# Patient Record
Sex: FEMALE | Race: BLACK OR AFRICAN AMERICAN | NOT HISPANIC OR LATINO | RURAL
[De-identification: names, ages, dates, MRNs, and addresses within clinical notes are randomized per-mention and may not be internally consistent; named-entity substitution may affect disease eponyms.]

---

## 2022-10-28 DIAGNOSIS — S82.61XD CLOSED DISPLACED FRACTURE OF LATERAL MALLEOLUS OF RIGHT FIBULA WITH ROUTINE HEALING: Primary | ICD-10-CM

## 2022-10-28 DIAGNOSIS — M25.571 RIGHT ANKLE PAIN: ICD-10-CM

## 2023-06-28 DIAGNOSIS — M76.821 POSTERIOR TIBIAL TENDON DYSFUNCTION, RIGHT: Primary | ICD-10-CM

## 2023-07-06 ENCOUNTER — CLINICAL SUPPORT (OUTPATIENT)
Dept: REHABILITATION | Facility: HOSPITAL | Age: 48
End: 2023-07-06
Payer: COMMERCIAL

## 2023-07-06 DIAGNOSIS — M25.671 DECREASED RANGE OF MOTION OF RIGHT ANKLE: ICD-10-CM

## 2023-07-06 DIAGNOSIS — M76.821 POSTERIOR TIBIAL TENDON DYSFUNCTION (PTTD) OF RIGHT LOWER EXTREMITY: Primary | ICD-10-CM

## 2023-07-06 PROCEDURE — 97161 PT EVAL LOW COMPLEX 20 MIN: CPT

## 2023-07-06 NOTE — PLAN OF CARE
"[facility]  Physical Therapy Initial Evaluation    Name: Sherin Zayas  Clinic Number: [id number]    Therapy Diagnosis:   Encounter Diagnoses   Name Primary?    Posterior tibial tendon dysfunction (PTTD) of right lower extremity Yes    Decreased range of motion of right ankle      Physician: Dimitri Ho, *    Physician Orders: PT Eval and Treat  Medical Diagnosis from Referral: M76.821 (ICD-10-CM) - Posterior tibial tendon dysfunction, right  Evaluation Date: 7/6/2023  Authorization Period Expiration: 6/27/2024  Plan of Care Expiration: 8/18/2023  Visit # / Visits authorized: 1/13 (including initial evaluation; 90 visits/calendar year)    Precautions: Standard and Fall    Time In: 1029  Time Out: 1110  Total Appointment Time (timed & untimed codes): 41 minutes    Subjective     Date of onset: 8/19/2022    History of current condition - SHERIN reports she broke a bone in both of her feet due to a fall off a curb on 8/19/2022. 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 Patient reports began to have an increase in pain over the last few weeks. Patient is now off work for 6 weeks and is in a walking boot for the right lower extremity (to be work x 6 weeks). Patient follow-up with Dr. Ho on 8/4/2023.      Falls: 1 fall ~1 month ago due to knees "giving out" (no significant injuries; no medical care sought)    Imaging: no related imaging available to view in Epic    Prior Therapy: home health physical therapy following initial fall in the Fall of 2022  Social History: lives alone  Steps/Stairs: none  Occupation: dialysis technician; Patient reports she does not feel as though she is going to be able to return to work.  Driving: Yes  Durable Medical Equipment: right walking boot; single point cane; rolling walker  Dominant Extremity: right  Affected Extremity: right  Prior Level of Function: independent with all " functional mobility without assistive device  Current Level of Function: modified independent with the use of a single point cane and right walking boot    Pain:  Current 5/10, worst 9/10, best 5/10   Location: below right medial malleolus  Description: Aching  Aggravating Factors: prolonged standing and walking  Easing Factors: pain medication, ice, rest, and elevation    Pts goals: Patient's goal is to decrease her right ankle pain.     Medical History:   No past medical history on file.    Surgical History:   Sherin Zayas  has no past surgical history on file.    Medications:   Sherin currently has no medications in their medication list.    Allergies:   Review of patient's allergies indicates:  Not on File     Objective     Range of Motion/Strength:     Knee Right Left Pain/Dysfunction with Movement   AROM/PROM      flexion  WFL  WFL    extension  WFL  WFL      Ankle Right Left Pain/Dysfunction with Movement   AROM/PROM      dorsiflexion  2*  6*    plantarflexion  45*  40*    inversion 20* 28*    eversion 24* 20*      L/E MMT Right Left Pain/Dysfunction with Movement   Knee Flexion 4/5 4/5    Knee Extension 4/5 4/5    Ankle DF 4+/5 4/5    Ankle PF 4/5 4/5    Ankle Inversion 4/5 4/5    Ankle Eversion 4/5 4/5      Posture: bilateral over pronation in standing (left worse)    Palpation: tenderness on palpation just distal to the medial malleolus    Sensation: WFL for light touch, pain, and temperature; no reports of numbness/tingling    Flexibility: mild stiffness in bilateral ankles    Gait Analysis: independent without assistive device; right walking boot donned; decreased step lengths bilaterally; side base of support    Transfers: modified independent with use of upper extremities    Other: up steps with a step-to pattern leading with the left lower extremity with bilateral upper extremity support; down steps with a step-to pattern leading with the right lower extremity with bilateral upper extremity  "support    Intake Outcome Measure for FOTO Survey    Therapist reviewed FOTO scores for Sherin Zayas on 7/6/2023.   FOTO documents entered into EPIC - see Media section.    Intake Score: 25%       Patient Education and Home Exercises     Education provided: Patient educated on the importance of completing skilled physical therapy treatment and home exercise program as prescribed to maximize functional gains.     Written Home Exercises Provided: yes. Exercises were reviewed and SHERIN was able to demonstrate them prior to the end of the session. SHERIN demonstrated good  understanding of the education provided.     See EMR under "Patient Instructions" for exercises provided during therapy sessions.    Assessment     Sherin is a 48 y.o. female referred to outpatient physical therapy with a medical diagnosis of M76.821 (ICD-10-CM) - Posterior tibial tendon dysfunction, right. Pt presents to PT with decreased right ankle range of motion, bilateral lower extremity weakness, increased assist required with transfers, and abnormal gait/stair negotiation mechanics.    Pt prognosis is Good.   Pt will benefit from skilled outpatient Physical Therapy to address the deficits stated above and in the chart below, provide pt/family education, and to maximize pt's level of independence.     Plan of care discussed with patient: Yes  Pt's spiritual, cultural and educational needs considered and pt agreeable to plan of care and goals as stated below:     Anticipated Barriers for therapy: compliance with home exercise program; natural course of healing    Medical Necessity is demonstrated by the following:  History  Co-morbidities and personal factors that may impact the plan of care [] LOW: no personal factors / co-morbidities  [x] MODERATE: 1-2 personal factors / co-morbidities  [] HIGH: 3+ personal factors / co-morbidities    Moderate / High Support Documentation:   Co-morbidities affecting plan of care: N/A    Personal Factors: "   attitudes     Examination  Body Structures and Functions, activity limitations and participation restrictions that may impact the plan of care [x] LOW: addressing 1-2 elements  [] MODERATE: 3+ elements  [] HIGH: 4+ elements (please support below)    Moderate / High Support Documentation: N/A     Clinical Presentation [x] LOW: stable  [] MODERATE: Evolving  [] HIGH: Unstable     Decision Making/ Complexity Score: low       Goals:    Short Term Goals:  Patient will demonstrate independence with home exercise program to ensure carryover of treatment.  Patient will perform sit to/from stand with standby assist without upper extremity support to improve independence and safety with transfers.  Patient will improve right ankle dorsiflexion active range of motion to 6 degrees to improve functional use of the right ankle.   Patient will improve bilateral lower extremity strength to 4+/5 to improve independence and safety with bed mobility, transfers, and gait.  Patient will ambulate 150 feet without assistive device with supervision with normal gait mechanics to improve independence and safety with gait.     Long Term Goals:  Patient will perform sit to/from stand with independence without upper extremity support to improve independence and safety with transfers.  Patient will improve bilateral lower extremity strength to 5/5 to improve independence and safety with bed mobility, transfers, and gait..   Patient will ambulate 300 feet without assistive device with independence with normal gait mechanics including up/down curbs and ramps to improve independence and safety with community ambulation.    Plan     Plan of care Certification: 7/6/2023 to 8/18/2023.    Outpatient Physical Therapy 2 times weekly for 6 weeks to include the following interventions: Electrical Stimulation (premodulated IFC), Gait Training, Manual Therapy, Moist Heat/ Ice, Neuromuscular Re-ed, Patient Education, Therapeutic Activities, and Therapeutic  Madeleine.       Amos Barrientos, PT, DPT  7/6/2023      Physician's Signature: _________________________________________  Date: __________________________

## 2023-07-10 ENCOUNTER — CLINICAL SUPPORT (OUTPATIENT)
Dept: REHABILITATION | Facility: HOSPITAL | Age: 48
End: 2023-07-10
Payer: COMMERCIAL

## 2023-07-10 DIAGNOSIS — M76.821 POSTERIOR TIBIAL TENDON DYSFUNCTION (PTTD) OF RIGHT LOWER EXTREMITY: Primary | ICD-10-CM

## 2023-07-10 DIAGNOSIS — M25.671 DECREASED RANGE OF MOTION OF RIGHT ANKLE: ICD-10-CM

## 2023-07-10 PROCEDURE — 97140 MANUAL THERAPY 1/> REGIONS: CPT | Mod: CQ

## 2023-07-10 PROCEDURE — 97112 NEUROMUSCULAR REEDUCATION: CPT | Mod: CQ

## 2023-07-10 PROCEDURE — 97110 THERAPEUTIC EXERCISES: CPT | Mod: CQ

## 2023-07-10 NOTE — PROGRESS NOTES
"[facility]  Physical Therapy Treatment Note    Name: Sherin Zayas  Clinic Number: [id number]    Therapy Diagnosis: No diagnosis found.  Physician: Order, Paper    Visit Date: 7/10/2023    Physician Orders: PT Eval and Treat  Medical Diagnosis from Referral: M76.821 (ICD-10-CM) - Posterior tibial tendon dysfunction, right  Evaluation Date: 7/6/2023  Authorization Period Expiration: 6/27/2024  Plan of Care Expiration: 8/18/2023  Visit # / Visits authorized: 2/13 (including initial evaluation; 90 visits/calendar year)  PTA Visit #: 1    Time In: 1101  Time Out: 1140  Total Billable Time: 39 minutes    Precautions: Standard and Fall    Subjective     Pt reports: her right ankle is hurting today; patient did not have boot on upon arrival to therapy.    She was compliant with home exercise program. "They hurt when I do them. Around my toes too."    Pain: 6/10  Location: below right medial malleolus    Objective     SHERIN received therapeutic exercises to develop strength, endurance, ROM, and flexibility for 20 minutes including:    NuStep: x 6 minutes  Calf stretch on slant board: x 2 minutes  Towel scrunches: x 20 reps  Theraband right ankle strengthening: plantarflexion, dorsiflexion, inversion, and eversion; red theraband; x 20 reps each      SHERIN received the following manual therapy techniques: were applied to the: right ankle for 8 minutes, including:    Static stretching into plantar flexion, dorsiflexion, inversion, and eversion    SHERIN participated in neuromuscular re-education activities to improve: Balance and Coordination for 11 minutes. The following activities were included:    [place] pickup: x 3 minutes  Ankle ABC's: x 2 sets  Heel and toe raises: x 20 reps; least Upper extremity support required    SHERIN participated in gait training to improve functional mobility and safety for 0 minutes, including:  Not performed      Home Exercises Provided and Patient Education " Provided     Education provided: Patient educated on the importance of completing skilled physical therapy treatment and home exercise program as prescribed to maximize functional gains.    Written Home Exercises Provided: Patient instructed to cont prior HEP. Exercises were reviewed and PIPER was able to demonstrate them prior to the end of the session.  PIPER demonstrated good  understanding of the education provided.     See EMR under Patient Instructions for exercises provided  during therapy sessions .    Assessment     Patient with good overall effort. Patient able to perform all exercises with no increase in pain reported. Patient states her ankle felt tired and sore post treatment, but no increase in pain reported.    PIPER is progressing well towards her goals.   Pt prognosis is Good.     Pt will continue to benefit from skilled outpatient physical therapy to address the deficits listed in the problem list box on initial evaluation, provide pt/family education, and to maximize pt's level of independence in the home and community environment.     Pt's spiritual, cultural, and educational needs considered and pt agreeable to plan of care and goals.     Anticipated barriers to physical therapy: compliance with home exercise program; natural course of healing    Goals:    Short Term Goals:  Patient will demonstrate independence with home exercise program to ensure carryover of treatment.  Patient will perform sit to/from stand with standby assist without upper extremity support to improve independence and safety with transfers.  Patient will improve right ankle dorsiflexion active range of motion to 6 degrees to improve functional use of the right ankle.   Patient will improve bilateral lower extremity strength to 4+/5 to improve independence and safety with bed mobility, transfers, and gait.  Patient will ambulate 150 feet without assistive device with supervision with normal gait mechanics to improve  independence and safety with gait.      Long Term Goals:  Patient will perform sit to/from stand with independence without upper extremity support to improve independence and safety with transfers.  Patient will improve bilateral lower extremity strength to 5/5 to improve independence and safety with bed mobility, transfers, and gait..   Patient will ambulate 300 feet without assistive device with independence with normal gait mechanics including up/down curbs and ramps to improve independence and safety with community ambulation.    Plan     Patient will continue to benefit from skilled physical therapy treatment as prescribed working towards goals listed above to maximize functional potential.       Charlie Mendoza, PTA  7/10/2023

## 2023-07-14 ENCOUNTER — CLINICAL SUPPORT (OUTPATIENT)
Dept: REHABILITATION | Facility: HOSPITAL | Age: 48
End: 2023-07-14
Payer: COMMERCIAL

## 2023-07-14 DIAGNOSIS — M25.671 DECREASED RANGE OF MOTION OF RIGHT ANKLE: ICD-10-CM

## 2023-07-14 DIAGNOSIS — M76.821 POSTERIOR TIBIAL TENDON DYSFUNCTION (PTTD) OF RIGHT LOWER EXTREMITY: Primary | ICD-10-CM

## 2023-07-14 PROCEDURE — 97110 THERAPEUTIC EXERCISES: CPT | Mod: CQ

## 2023-07-14 PROCEDURE — 97112 NEUROMUSCULAR REEDUCATION: CPT | Mod: CQ

## 2023-07-14 PROCEDURE — 97140 MANUAL THERAPY 1/> REGIONS: CPT | Mod: CQ

## 2023-07-14 NOTE — PROGRESS NOTES
"[facility]  Physical Therapy Treatment Note    Name: Sherin Zayas  Clinic Number: [id number]    Therapy Diagnosis:   Encounter Diagnoses   Name Primary?    Posterior tibial tendon dysfunction (PTTD) of right lower extremity Yes    Decreased range of motion of right ankle      Physician: Order, Paper    Visit Date: 7/14/2023    Physician Orders: PT Eval and Treat  Medical Diagnosis from Referral: M76.821 (ICD-10-CM) - Posterior tibial tendon dysfunction, right  Evaluation Date: 7/6/2023  Authorization Period Expiration: 6/27/2024  Plan of Care Expiration: 8/18/2023  Visit # / Visits authorized: 3/13 (including initial evaluation; 90 visits/calendar year)  PTA Visit #: 2    Time In: 1012  Time Out: 1057  Total Billable Time: 45 minutes    Precautions: Standard and Fall    Subjective     Pt reports: both her ankles are hurting today; "it is okay until I try to stand up"; patient did not have boot on upon arrival to therapy stating "I didn't wear my boot in because I knew I would probably have to just take them off."    She was compliant with home exercise program. "They hurt when I do them. Around my toes too."    Pain: 5/10 ; 5/10  Location: below right medial malleolus; left ankle    Objective     SHERIN received therapeutic exercises to develop strength, endurance, ROM, and flexibility for 22 minutes including:    NuStep: x 6 minutes  Calf stretch on slant board: x 2 minutes  Towel scrunches: x 20 reps  Theraband right ankle strengthening: plantarflexion, dorsiflexion, inversion, and eversion; red theraband; x 20 reps each    SHERIN received the following manual therapy techniques: were applied to the: right ankle for 10 minutes, including:    Static stretching into plantar flexion, dorsiflexion, inversion, and eversion    SHERIN participated in neuromuscular re-education activities to improve: Balance and Coordination for 13 minutes. The following activities were " included:    [place] pickup: x 3 minutes  Ankle ABC's: x 2 sets  Heel and toe raises: x 20 reps; least Upper extremity support required    PIPER participated in gait training to improve functional mobility and safety for 0 minutes, including:  Not performed      Home Exercises Provided and Patient Education Provided     Education provided: Patient educated on the importance of completing skilled physical therapy treatment and home exercise program as prescribed to maximize functional gains.    Written Home Exercises Provided: Patient instructed to cont prior HEP. Exercises were reviewed and PIPER was able to demonstrate them prior to the end of the session.  PIPER demonstrated good  understanding of the education provided.     See EMR under Patient Instructions for exercises provided  during therapy sessions .    Assessment     Patient with good overall effort. Patient able to perform all exercises with no increase in pain reported. Patient with good tolerance of manual stretching of right ankle in all directions. Patient states her ankle felt tired post treatment.    IPPER is progressing well towards her goals.   Pt prognosis is Good.     Pt will continue to benefit from skilled outpatient physical therapy to address the deficits listed in the problem list box on initial evaluation, provide pt/family education, and to maximize pt's level of independence in the home and community environment.     Pt's spiritual, cultural, and educational needs considered and pt agreeable to plan of care and goals.     Anticipated barriers to physical therapy: compliance with home exercise program; natural course of healing    Goals:    Short Term Goals:  Patient will demonstrate independence with home exercise program to ensure carryover of treatment.  Patient will perform sit to/from stand with standby assist without upper extremity support to improve independence and safety with transfers.  Patient will improve right ankle  dorsiflexion active range of motion to 6 degrees to improve functional use of the right ankle.   Patient will improve bilateral lower extremity strength to 4+/5 to improve independence and safety with bed mobility, transfers, and gait.  Patient will ambulate 150 feet without assistive device with supervision with normal gait mechanics to improve independence and safety with gait.      Long Term Goals:  Patient will perform sit to/from stand with independence without upper extremity support to improve independence and safety with transfers.  Patient will improve bilateral lower extremity strength to 5/5 to improve independence and safety with bed mobility, transfers, and gait..   Patient will ambulate 300 feet without assistive device with independence with normal gait mechanics including up/down curbs and ramps to improve independence and safety with community ambulation.    Plan     Patient will continue to benefit from skilled physical therapy treatment as prescribed working towards goals listed above to maximize functional potential.       Charlie Mendoza, PTA  7/14/2023

## 2023-07-21 ENCOUNTER — CLINICAL SUPPORT (OUTPATIENT)
Dept: REHABILITATION | Facility: HOSPITAL | Age: 48
End: 2023-07-21
Payer: COMMERCIAL

## 2023-07-21 DIAGNOSIS — M25.671 DECREASED RANGE OF MOTION OF RIGHT ANKLE: ICD-10-CM

## 2023-07-21 DIAGNOSIS — M76.821 POSTERIOR TIBIAL TENDON DYSFUNCTION (PTTD) OF RIGHT LOWER EXTREMITY: Primary | ICD-10-CM

## 2023-07-21 PROCEDURE — 97112 NEUROMUSCULAR REEDUCATION: CPT | Mod: CQ

## 2023-07-21 PROCEDURE — 97140 MANUAL THERAPY 1/> REGIONS: CPT | Mod: CQ

## 2023-07-21 PROCEDURE — 97110 THERAPEUTIC EXERCISES: CPT | Mod: CQ

## 2023-07-21 NOTE — PROGRESS NOTES
"[facility]  Physical Therapy Treatment Note    Name: Sherin Zayas  Clinic Number: [id number]    Therapy Diagnosis:   Encounter Diagnoses   Name Primary?    Posterior tibial tendon dysfunction (PTTD) of right lower extremity Yes    Decreased range of motion of right ankle      Physician: Order, Paper    Visit Date: 7/21/2023    Physician Orders: PT Eval and Treat  Medical Diagnosis from Referral: M76.821 (ICD-10-CM) - Posterior tibial tendon dysfunction, right  Evaluation Date: 7/6/2023  Authorization Period Expiration: 6/27/2024  Plan of Care Expiration: 8/18/2023  Visit # / Visits authorized: 4/13 (including initial evaluation; 90 visits/calendar year)  PTA Visit #: 3    Time In: 1115 (Patient 9 minutes late for appointment time)  Time Out: 1155  Total Billable Time: 40 minutes    Precautions: Standard and Fall    Subjective     Pt reports: both ankles are hurting her this morning stating "I had to take a pain pill for it this morning."    She was compliant with home exercise program.    Pain: 8/10 ; 8/10  Location: below right medial malleolus; left ankle    Objective     SHERIN received therapeutic exercises to develop strength, endurance, ROM, and flexibility for 20 minutes including:    NuStep: x 5 minutes  Calf stretch on slant board: x 2 minutes  Towel scrunches: x 20 reps  Theraband right ankle strengthening (bilateral): plantarflexion, dorsiflexion, inversion, and eversion; red theraband; x 20 reps each    SHERIN received the following manual therapy techniques: were applied to the: right ankle for 10 minutes, including:    Static stretching into plantar flexion, dorsiflexion, inversion, and eversion    SHERIN participated in neuromuscular re-education activities to improve: Balance and Coordination for 10 minutes. The following activities were included:    [place] pickup: x 3 minutes  Ankle ABC's: x 2 sets  Heel and toe raises: x 20 reps; least Upper extremity support " required    PIPER participated in gait training to improve functional mobility and safety for 0 minutes, including:  Not performed      Home Exercises Provided and Patient Education Provided     Education provided: Patient educated on the importance of completing skilled physical therapy treatment and home exercise program as prescribed to maximize functional gains.    Written Home Exercises Provided: Patient instructed to cont prior HEP. Exercises were reviewed and PIPER was able to demonstrate them prior to the end of the session.  PIPER demonstrated good  understanding of the education provided.     See EMR under Patient Instructions for exercises provided  during therapy sessions .    Assessment     Patient with good overall effort. Patient able to perform all exercises with no increase in pain reported. Patient with no new complaints following treatment.    PIPER is progressing well towards her goals.   Pt prognosis is Good.     Pt will continue to benefit from skilled outpatient physical therapy to address the deficits listed in the problem list box on initial evaluation, provide pt/family education, and to maximize pt's level of independence in the home and community environment.     Pt's spiritual, cultural, and educational needs considered and pt agreeable to plan of care and goals.     Anticipated barriers to physical therapy: compliance with home exercise program; natural course of healing    Goals:    Short Term Goals:  Patient will demonstrate independence with home exercise program to ensure carryover of treatment.  Patient will perform sit to/from stand with standby assist without upper extremity support to improve independence and safety with transfers.  Patient will improve right ankle dorsiflexion active range of motion to 6 degrees to improve functional use of the right ankle.   Patient will improve bilateral lower extremity strength to 4+/5 to improve independence and safety with bed mobility,  transfers, and gait.  Patient will ambulate 150 feet without assistive device with supervision with normal gait mechanics to improve independence and safety with gait.      Long Term Goals:  Patient will perform sit to/from stand with independence without upper extremity support to improve independence and safety with transfers.  Patient will improve bilateral lower extremity strength to 5/5 to improve independence and safety with bed mobility, transfers, and gait..   Patient will ambulate 300 feet without assistive device with independence with normal gait mechanics including up/down curbs and ramps to improve independence and safety with community ambulation.    Plan     Patient will continue to benefit from skilled physical therapy treatment as prescribed working towards goals listed above to maximize functional potential.       Charlie Mendoza, PTA  7/21/2023

## 2023-07-27 ENCOUNTER — CLINICAL SUPPORT (OUTPATIENT)
Dept: REHABILITATION | Facility: HOSPITAL | Age: 48
End: 2023-07-27
Payer: COMMERCIAL

## 2023-07-27 DIAGNOSIS — M76.821 POSTERIOR TIBIAL TENDON DYSFUNCTION (PTTD) OF RIGHT LOWER EXTREMITY: Primary | ICD-10-CM

## 2023-07-27 DIAGNOSIS — M25.671 DECREASED RANGE OF MOTION OF RIGHT ANKLE: ICD-10-CM

## 2023-07-27 PROCEDURE — 97110 THERAPEUTIC EXERCISES: CPT | Mod: CQ

## 2023-07-27 PROCEDURE — 97112 NEUROMUSCULAR REEDUCATION: CPT | Mod: CQ

## 2023-07-27 NOTE — PROGRESS NOTES
"[facility]  Physical Therapy Treatment Note    Name: Sherin aZyas  Clinic Number: [id number]    Therapy Diagnosis:   Encounter Diagnoses   Name Primary?    Posterior tibial tendon dysfunction (PTTD) of right lower extremity Yes    Decreased range of motion of right ankle      Physician: Dimitri Ho, *    Visit Date: 7/27/2023    Physician Orders: PT Eval and Treat  Medical Diagnosis from Referral: M76.821 (ICD-10-CM) - Posterior tibial tendon dysfunction, right  Evaluation Date: 7/6/2023  Authorization Period Expiration: 6/27/2024  Plan of Care Expiration: 8/18/2023  Visit # / Visits authorized: 5/13 (including initial evaluation; 90 visits/calendar year)  PTA Visit #: 4    Time In: 1019  Time Out: 1057  Total Billable Time: 38 minutes    Precautions: Standard and Fall    Subjective     Pt reports: pain in both ankles this morning that she rates as 7/10    She was compliant with home exercise program.    Pain: 7/10 ; 7/10  Location: below right medial malleolus; left ankle    Objective     SHERIN received therapeutic exercises to develop strength, endurance, ROM, and flexibility for 23 minutes including:    NuStep: x 5 minutes  Calf stretch on slant board: x 2 minutes  Towel scrunches: x 30 reps  Theraband right ankle strengthening (bilateral): plantarflexion, dorsiflexion, inversion, and eversion; green theraband; x 20 reps each  2" step up: anterior; 2 x 10 reps    SHERIN received the following manual therapy techniques: were applied to the: right ankle for 0 minutes, including:    Static stretching into plantar flexion, dorsiflexion, inversion, and eversion (not performed)    SHERIN participated in neuromuscular re-education activities to improve: Balance and Coordination for 15 minutes. The following activities were included:    [place] pickup (right): x 3 minutes  Ankle ABC's (bilateral): x 2 sets each  Heel and toe raises: x 20 reps; least Upper extremity support " required  Single leg stance: 3 x 15 sec hold each    PIPER participated in gait training to improve functional mobility and safety for 0 minutes, including:  Not performed      Home Exercises Provided and Patient Education Provided     Education provided: Patient educated on the importance of completing skilled physical therapy treatment and home exercise program as prescribed to maximize functional gains.    Written Home Exercises Provided: Patient instructed to cont prior HEP. Exercises were reviewed and PIPER was able to demonstrate them prior to the end of the session.  PIPER demonstrated good  understanding of the education provided.     See EMR under Patient Instructions for exercises provided  during therapy sessions .    Assessment     Patient with good overall effort. Patient able to perform all exercises with no increase in pain reported. Patient with good tolerance of added single leg stance, anterior step ups, and added resistance to ankle 4 way exercises. Patient with no new complaints following treatment.    PIPER is progressing well towards her goals.   Pt prognosis is Good.     Pt will continue to benefit from skilled outpatient physical therapy to address the deficits listed in the problem list box on initial evaluation, provide pt/family education, and to maximize pt's level of independence in the home and community environment.     Pt's spiritual, cultural, and educational needs considered and pt agreeable to plan of care and goals.     Anticipated barriers to physical therapy: compliance with home exercise program; natural course of healing    Goals:    Short Term Goals:  Patient will demonstrate independence with home exercise program to ensure carryover of treatment.  Patient will perform sit to/from stand with standby assist without upper extremity support to improve independence and safety with transfers.  Patient will improve right ankle dorsiflexion active range of motion to 6 degrees to  improve functional use of the right ankle.   Patient will improve bilateral lower extremity strength to 4+/5 to improve independence and safety with bed mobility, transfers, and gait.  Patient will ambulate 150 feet without assistive device with supervision with normal gait mechanics to improve independence and safety with gait.      Long Term Goals:  Patient will perform sit to/from stand with independence without upper extremity support to improve independence and safety with transfers.  Patient will improve bilateral lower extremity strength to 5/5 to improve independence and safety with bed mobility, transfers, and gait..   Patient will ambulate 300 feet without assistive device with independence with normal gait mechanics including up/down curbs and ramps to improve independence and safety with community ambulation.    Plan     Patient will continue to benefit from skilled physical therapy treatment as prescribed working towards goals listed above to maximize functional potential.       Charlie Mendoza, JORDI  7/27/2023

## 2023-08-01 ENCOUNTER — CLINICAL SUPPORT (OUTPATIENT)
Dept: REHABILITATION | Facility: HOSPITAL | Age: 48
End: 2023-08-01
Payer: COMMERCIAL

## 2023-08-01 DIAGNOSIS — M76.821 POSTERIOR TIBIAL TENDON DYSFUNCTION (PTTD) OF RIGHT LOWER EXTREMITY: Primary | ICD-10-CM

## 2023-08-01 DIAGNOSIS — M25.671 DECREASED RANGE OF MOTION OF RIGHT ANKLE: ICD-10-CM

## 2023-08-01 PROCEDURE — 97110 THERAPEUTIC EXERCISES: CPT | Mod: CQ

## 2023-08-01 PROCEDURE — 97112 NEUROMUSCULAR REEDUCATION: CPT | Mod: CQ

## 2023-08-01 NOTE — PROGRESS NOTES
"[facility]  Physical Therapy Treatment Note    Name: Sherin Zayas  Clinic Number: [id number]    Therapy Diagnosis:   No diagnosis found.    Physician: Order, Paper    Visit Date: 8/1/2023    Physician Orders: PT Eval and Treat  Medical Diagnosis from Referral: M76.821 (ICD-10-CM) - Posterior tibial tendon dysfunction, right  Evaluation Date: 7/6/2023  Authorization Period Expiration: 6/27/2024  Plan of Care Expiration: 8/18/2023  Visit # / Visits authorized: 6/13 (including initial evaluation; 90 visits/calendar year)  PTA Visit #: 5    Time In: 1012 (patient's treatment started before patient was checked in)   Time Out: 1050  Total Billable Time: 38 minutes    Precautions: Standard and Fall    Subjective     Pt reports: pain in both ankles this morning that she rates as 6/10    She was compliant with home exercise program.    Pain: 6/10 ; 6/10  Location: below right medial malleolus; left ankle    Objective     SHERIN received therapeutic exercises to develop strength, endurance, ROM, and flexibility for 23 minutes including:  NuStep: x 5 minutes  Calf stretch on slant board: x 2 minutes  Towel scrunches: x 30 reps  Theraband right ankle strengthening (bilateral): plantarflexion, dorsiflexion, inversion, and eversion; green theraband; x 20 reps each  2" step up: anterior; 2 x 10 reps    SHERIN received the following manual therapy techniques: were applied to the: right ankle for 0 minutes, including:  Static stretching into plantar flexion, dorsiflexion, inversion, and eversion (not performed)    SHERIN participated in neuromuscular re-education activities to improve: Balance and Coordination for 15 minutes. The following activities were included:  [place] pickup (right): x 3 minutes  Ankle ABC's (bilateral): x 2 sets each  Heel and toe raises: x 20 reps; least Upper extremity support required  Single leg stance: 3 x 15 sec hold each    SHERIN participated in gait training to " improve functional mobility and safety for 0 minutes, including:  Not performed    Home Exercises Provided and Patient Education Provided     Education provided: Patient educated on the importance of completing skilled physical therapy treatment and home exercise program as prescri bed to maximize functional gains.    Written Home Exercises Provided: Patient instructed to cont prior HEP. Exercises were reviewed and PIPER was able to demonstrate them prior to the end of the session.  PIPER demonstrated good  understanding of the education provided.     See EMR under Patient Instructions for exercises provided  during therapy sessions .    Assessment     Patient with good overall effort. Patient had noticeable pain with heel to toe raises on this visit today. Patient able to perform all other exercises with no new complaints.     PIPER is progressing well towards her goals.   Pt prognosis is Good.     Pt will continue to benefit from skilled outpatient physical therapy to address the deficits listed in the problem list box on initial evaluation, provide pt/family education, and to maximize pt's level of independence in the home and community environment.     Pt's spiritual, cultural, and educational needs considered and pt agreeable to plan of care and goals.     Anticipated barriers to physical therapy: compliance with home exercise program; natural course of healing    Goals:    Short Term Goals:  Patient will demonstrate independence with home exercise program to ensure carryover of treatment.  Patient will perform sit to/from stand with standby assist without upper extremity support to improve independence and safety with transfers.  Patient will improve right ankle dorsiflexion active range of motion to 6 degrees to improve functional use of the right ankle.   Patient will improve bilateral lower extremity strength to 4+/5 to improve independence and safety with bed mobility, transfers, and gait.  Patient will  ambulate 150 feet without assistive device with supervision with normal gait mechanics to improve independence and safety with gait.      Long Term Goals:  Patient will perform sit to/from stand with independence without upper extremity support to improve independence and safety with transfers.  Patient will improve bilateral lower extremity strength to 5/5 to improve independence and safety with bed mobility, transfers, and gait..   Patient will ambulate 300 feet without assistive device with independence with normal gait mechanics including up/down curbs and ramps to improve independence and safety with community ambulation.    Plan     Patient will continue to benefit from skilled physical therapy treatment as prescribed working towards goals listed above to maximize functional potential.       Cathi Foss, PTA  8/1/2023

## 2023-09-06 DIAGNOSIS — M25.562 LEFT KNEE PAIN: Primary | ICD-10-CM

## 2023-09-06 DIAGNOSIS — M25.561 RIGHT KNEE PAIN: ICD-10-CM

## 2023-09-19 ENCOUNTER — CLINICAL SUPPORT (OUTPATIENT)
Dept: REHABILITATION | Facility: HOSPITAL | Age: 48
End: 2023-09-19
Payer: COMMERCIAL

## 2023-09-19 DIAGNOSIS — R29.898 WEAKNESS OF BOTH LOWER EXTREMITIES: ICD-10-CM

## 2023-09-19 DIAGNOSIS — M25.561 CHRONIC PAIN OF RIGHT KNEE: ICD-10-CM

## 2023-09-19 DIAGNOSIS — G89.29 CHRONIC PAIN OF LEFT KNEE: ICD-10-CM

## 2023-09-19 DIAGNOSIS — M25.662 DECREASED RANGE OF MOTION (ROM) OF BOTH KNEES: Primary | ICD-10-CM

## 2023-09-19 DIAGNOSIS — G89.29 CHRONIC PAIN OF RIGHT KNEE: ICD-10-CM

## 2023-09-19 DIAGNOSIS — M25.561 RIGHT KNEE PAIN: ICD-10-CM

## 2023-09-19 DIAGNOSIS — M25.661 DECREASED RANGE OF MOTION (ROM) OF BOTH KNEES: Primary | ICD-10-CM

## 2023-09-19 DIAGNOSIS — M25.562 LEFT KNEE PAIN: ICD-10-CM

## 2023-09-19 DIAGNOSIS — M25.562 CHRONIC PAIN OF LEFT KNEE: ICD-10-CM

## 2023-09-19 PROCEDURE — 97162 PT EVAL MOD COMPLEX 30 MIN: CPT

## 2023-09-19 NOTE — PLAN OF CARE
[facility]  Physical Therapy Initial Evaluation    Name: Piper Zayas  Clinic Number: [id number]    Therapy Diagnosis:   Encounter Diagnoses   Name Primary?    Left knee pain     Right knee pain     Decreased range of motion (ROM) of both knees Yes    Weakness of both lower extremities      Physician: Aries Jaquez MD    Physician Orders: PT Eval and Treat  Medical Diagnosis from Referral: M25.562 (ICD-10-CM) - Left knee pain and M25.561 (ICD-10-CM) - Right knee pain  Evaluation Date: 9/19/2023  Authorization Period Expiration: 9/5/2024  Plan of Care Expiration: 10/13/2023  Visit # / Visits authorized: 1/7 (including initial evaluation)    Time In: 1120  Time Out: 1149  Total Appointment Time (timed & untimed codes): 29 minutes    Precautions: Standard and Fall    Subjective     Date of onset: several years    History of current condition - PIPER reports she fell at work ~8 years ago and has had pain in both knees since that time. Patient is currently on short-term disability due to fractures in bilateral feet. Patient reports she would like to decrease her knee pain before returning to work to prevent further injuries. Patient reports she fell ~3 weeks ago with an increase in bilateral knee pain following. Patient also reports feeling as if her balance is not as good as it used to be. Patient reports she follows-up with Dr. Jaquez in early October 2023.      Falls: 1 fall ~3 weeks ago    Imaging: no imaging available to view in Epic    Prior Therapy: one recent episode of physical therapy treatment (6 visits) for right posterior tibial dysfunction  Social History: lives alone  Steps/Stairs: none  Occupation: dialysis technician  Driving: Yes but does not drive often  Durable Medical Equipment: single point cane  Dominant Extremity: right  Affected Extremity: both  Prior Level of Function: independent with all functional mobility without assistive device  Current Level of  Function: independent vs modified independent with all functional mobility with intermittent use of a single point cane    Pain:  Current 5/10, worst 8/10, best 5/10   Location: bilateral knees  Description: Aching  Aggravating Factors: Standing and Walking  Easing Factors: ice, rest, and elevation    Pts goals: Patient wishes to decrease the pain in bilateral knees to improve her quality of life.     Medical History:   No past medical history on file.    Surgical History:   Sherin Zayas  has no past surgical history on file.    Medications:   Sherin currently has no medications in their medication list.    Allergies:   Review of patient's allergies indicates:  Not on File     Objective     Range of motion:   Right Left    Hip flexion WFL WFL   Hip abduction WFL WFL   Hip adduction WFL WFL   Knee extension 0* 0*   Knee flexion 108* 110*   Ankle DF neutral neutral   Ankle PF WFL WFL     Manual muscle test:   Right  Left    Hip flexion  MMT strength: 4-/5  MMT strength: 4-/5   Hip abduction  MMT strength: 4-/5  MMT strength: 4-/5   Hip adduction  MMT strength: 4-/5  MMT strength: 4-/5   Knee extension  MMT strength: 4-/5  MMT strength: 4-/5   Knee flexion  MMT strength: 4-/5  MMT strength: 4-/5   Ankle dorsiflexion  MMT strength: 4-/5  MMT strength: 4-/5   Ankle plantarflexion  MMT strength: 4-/5  MMT strength: 4-/5     Incision: N/A    Gait:  Weight bearing precautions: WBAT  Assistive device: none  Ambulation distance and deviations: independent without assistive device; decreased step lengths; decreased heel strike bilaterally; excessive bilateral hip external rotation; no loss of balance  Stairs: up steps reciprocally with unilateral upper extremity support; down steps reciprocally with bilateral upper extremity support     Limitation/Restriction for FOTO Intake Survey    Therapist reviewed FOTO scores for Sherin Zayas on 9/19/2023.   FOTO documents entered into EPIC - see Media section.    Limitation Score: 21%    "    Patient Education and Home Exercises     Education provided: Patient educated on the importance of completing skilled physical therapy treatment and home exercise program as prescribed to maximize functional gains.     Written Home Exercises Provided: yes. Exercises were reviewed and SHERIN was able to demonstrate them prior to the end of the session. SHERIN demonstrated good  understanding of the education provided.     See EMR under "Patient Instructions" for exercises provided during therapy sessions.    Assessment     Sherin is a 48 y.o. female referred to outpatient physical therapy with a medical diagnosis of M25.562 (ICD-10-CM) - Left knee pain and M25.561 (ICD-10-CM) - Right knee pain. Pt presents to PT with bilateral knee pain, decreased bilateral knee flexion, bilateral lower extremity weakness, and abnormal gait/stair negotiation mechanics.    Pt prognosis is Fair.   Pt will benefit from skilled outpatient Physical Therapy to address the deficits stated above and in the chart below, provide pt/family education, and to maximize pt's level of independence.     Plan of care discussed with patient: Yes  Pt's spiritual, cultural and educational needs considered and pt agreeable to plan of care and goals as stated below:     Anticipated Barriers for therapy: compliance with home exercise program; chronicity of patient's condition    Medical Necessity is demonstrated by the following:  History  Co-morbidities and personal factors that may impact the plan of care [] LOW: no personal factors / co-morbidities  [x] MODERATE: 1-2 personal factors / co-morbidities  [] HIGH: 3+ personal factors / co-morbidities    Moderate / High Support Documentation:   Co-morbidities affecting plan of care: N/A    Personal Factors:   lifestyle  attitudes     Examination  Body Structures and Functions, activity limitations and participation restrictions that may impact the plan of care [] LOW: addressing 1-2 elements  [x] MODERATE: " 3+ elements  [] HIGH: 4+ elements (please support below)    Moderate / High Support Documentation: range of motion, strength, and gait/stair negotiation mechanics     Clinical Presentation [x] LOW: stable  [] MODERATE: Evolving  [] HIGH: Unstable     Decision Making/ Complexity Score: moderate       Goals:    Short Term Goals:  Patient will demonstrate independence with home exercise program to ensure carryover of treatment.  Patient will demonstrate 115 degrees of bilateral knee flexion to improve functional use of bilateral lower extremities  Patient will improve bilateral lower extremity strength to 4/5 to improve independence and safety with bed mobility, transfers, and gait.  Patient will ambulate 150 feet without assistive device with independence with neutral hip alignment and adequate bilateral heel strike to improve independence and safety with gait.   Patient will ascend/descend 5 steps reciprocally with unilateral upper extremity support to improve independence and safety with stair negotiation.  Patient will report a reduction in bilateral knee pain from 8/10 to 7/10 at worst to improve quality of life.     Long Term Goals:  Patient will improve bilateral lower extremity strength to 4+/5 to improve independence and safety with bed mobility, transfers, and gait.  Patient will ambulate 300 feet without assistive device with independence with normal gait mechanics including up/down curbs and ramps to improve independence and safety with community ambulation.  Patient will ascend/descend 5 steps reciprocally without upper extremity support to improve independence and safety with stair negotiation.  Patient will report a reduction in bilateral knee pain from 8/10 to 6/10 at worst to improve quality of life.     Plan     Plan of care Certification: 9/19/2023 to 10/13/2023.    Outpatient Physical Therapy 2 times weekly for 3 weeks to include the following interventions: Electrical Stimulation (IFC/premodulated  IFC), Gait Training, Manual Therapy, Moist Heat/ Ice, Neuromuscular Re-ed, Patient Education, Therapeutic Activities, Therapeutic Exercise, and Ultrasound.       Amos Barrientos, PT, DPT  9/19/2023      Physician's Signature: _________________________________________  Date: __________________________

## 2023-09-26 ENCOUNTER — CLINICAL SUPPORT (OUTPATIENT)
Dept: REHABILITATION | Facility: HOSPITAL | Age: 48
End: 2023-09-26
Payer: COMMERCIAL

## 2023-09-26 DIAGNOSIS — M25.661 DECREASED RANGE OF MOTION (ROM) OF BOTH KNEES: Primary | ICD-10-CM

## 2023-09-26 DIAGNOSIS — R29.898 WEAKNESS OF BOTH LOWER EXTREMITIES: ICD-10-CM

## 2023-09-26 DIAGNOSIS — M25.662 DECREASED RANGE OF MOTION (ROM) OF BOTH KNEES: Primary | ICD-10-CM

## 2023-09-26 DIAGNOSIS — G89.29 CHRONIC PAIN OF LEFT KNEE: ICD-10-CM

## 2023-09-26 DIAGNOSIS — G89.29 CHRONIC PAIN OF RIGHT KNEE: ICD-10-CM

## 2023-09-26 DIAGNOSIS — M25.562 CHRONIC PAIN OF LEFT KNEE: ICD-10-CM

## 2023-09-26 DIAGNOSIS — M25.561 CHRONIC PAIN OF RIGHT KNEE: ICD-10-CM

## 2023-09-26 PROCEDURE — 97110 THERAPEUTIC EXERCISES: CPT | Mod: CQ

## 2023-09-26 PROCEDURE — 97112 NEUROMUSCULAR REEDUCATION: CPT | Mod: CQ

## 2023-09-26 PROCEDURE — 97530 THERAPEUTIC ACTIVITIES: CPT | Mod: CQ

## 2023-09-26 NOTE — PROGRESS NOTES
Physical Therapy Treatment Note     Name: Sherin Zayas  Clinic Number: [id number]    Therapy Diagnosis:   Encounter Diagnoses   Name Primary?    Chronic pain of left knee     Chronic pain of right knee     Decreased range of motion (ROM) of both knees Yes    Weakness of both lower extremities      Physician: Dimitri Ho, *    Visit Date: 9/26/2023    Physician Orders: PT Eval and Treat  Medical Diagnosis from Referral: M25.562 (ICD-10-CM) - Left knee pain and M25.561 (ICD-10-CM) - Right knee pain  Evaluation Date: 9/19/2023  Authorization Period Expiration: 9/5/2024  Plan of Care Expiration: 10/13/2023  Visit # / Visits authorized: 2/7 (including initial evaluation)  PTA Visit #: 1    Time In: 1023  Time Out: 1103  Total Billable Time: 40 minutes    Precautions: Standard    Received Plan of Care per Aston Gorman PT     Subjective     Pt reports: pain as noted; no pain meds taken today; takes mobic prn. Has cane in the car and access to a walker and wc  She was compliant with home exercise program.  Response to previous treatment: no c/o      Pain: 6/10  Location: bilateral knees, ankles      Objective     Range of motion measures:   Flexion  110 degrees bilateral   Quad lag   -5 degrees bilateral w/ long arc quad     SHERIN received therapeutic exercises to develop strength, ROM, and flexibility for 10 minutes including:  Nustep x 5 minutes   Slant board bilateral calf stretch x 2 minutes   Hamstring stretch on step, 3x20 second hold, bilateral       SHERIN participated in neuromuscular re-education activities to improve: Balance, Kinesthetic, and Proprioception for 20 minutes. The following activities were included:  In // bars: marching, mini squats, heel/toe raises, hip abduction, hip extension   In // bars x 2 laps: side stepping left and right, toe walking, heel walking, tandem stepping fwd/bwd    SHERIN participated in dynamic functional therapeutic activities to improve functional performance for 8   minutes, including:  Sit to stand x 10 repetitions in // bars  Ascending/descending 5 steps x 2 w/ bilateral railing    PIPER participated in gait training to improve functional mobility and safety for 2  minutes, including:  Focus on terminal knee extension with heel strike at treatment end    PIPER received the following direct contact modalities after being cleared for contraindications:     PIPER received the following supervised modalities after being cleared for contradictions:     PIPER received cold pack for 0 minutes       Home Exercises Provided and Patient Education Provided     Education provided: continue with current home exercise program, pain free, adding exercises/stretches as done today as able; take mobic as directed daily to help with pain, inflammation; check with dr about taking tylenol prn; ice packs prn for pain/edema    Written Home Exercises Provided: Patient instructed to cont prior HEP.  Exercises were reviewed and PIPER was able to demonstrate them prior to the end of the session.  PIPER demonstrated good  understanding of the education provided.     See EMR under Patient Instructions for exercises provided  9/19/2023 .    Assessment     Gradually improving range of motion and strength; lacks full terminal knee extension as noted  PIPER Is progressing well towards her goals.   Pt prognosis is Good.     Pt will continue to benefit from skilled outpatient physical therapy to address the deficits listed in the problem list box on initial evaluation, provide pt/family education and to maximize pt's level of independence in the home and community environment.      Anticipated barriers to physical therapy: home exercise program compliance     Goals:  Short Term Goals:  Patient will demonstrate independence with home exercise program to ensure carryover of treatment.  Patient will demonstrate 115 degrees of bilateral knee flexion to improve functional use of bilateral lower extremities  Patient  will improve bilateral lower extremity strength to 4/5 to improve independence and safety with bed mobility, transfers, and gait.  Patient will ambulate 150 feet without assistive device with independence with neutral hip alignment and adequate bilateral heel strike to improve independence and safety with gait.   Patient will ascend/descend 5 steps reciprocally with unilateral upper extremity support to improve independence and safety with stair negotiation.  Patient will report a reduction in bilateral knee pain from 8/10 to 7/10 at worst to improve quality of life.      Long Term Goals:  Patient will improve bilateral lower extremity strength to 4+/5 to improve independence and safety with bed mobility, transfers, and gait.  Patient will ambulate 300 feet without assistive device with independence with normal gait mechanics including up/down curbs and ramps to improve independence and safety with community ambulation.  Patient will ascend/descend 5 steps reciprocally without upper extremity support to improve independence and safety with stair negotiation.  Patient will report a reduction in bilateral knee pain from 8/10 to 6/10 at worst to improve quality of life.     Plan     Plan of care Certification: 9/19/2023 to 10/13/2023.     Outpatient Physical Therapy 2 times weekly for 3 weeks to include the following interventions: Electrical Stimulation (IFC/premodulated IFC), Gait Training, Manual Therapy, Moist Heat/ Ice, Neuromuscular Re-ed, Patient Education, Therapeutic Activities, Therapeutic Exercise, and Ultrasound.     Continue per Plan of Care and progress as pt able   Perri Jewell, PTA  9/26/2023

## 2023-09-29 ENCOUNTER — CLINICAL SUPPORT (OUTPATIENT)
Dept: REHABILITATION | Facility: HOSPITAL | Age: 48
End: 2023-09-29
Payer: COMMERCIAL

## 2023-09-29 DIAGNOSIS — R29.898 WEAKNESS OF BOTH LOWER EXTREMITIES: ICD-10-CM

## 2023-09-29 DIAGNOSIS — G89.29 CHRONIC PAIN OF RIGHT KNEE: ICD-10-CM

## 2023-09-29 DIAGNOSIS — G89.29 CHRONIC PAIN OF LEFT KNEE: Primary | ICD-10-CM

## 2023-09-29 DIAGNOSIS — M25.661 DECREASED RANGE OF MOTION (ROM) OF BOTH KNEES: ICD-10-CM

## 2023-09-29 DIAGNOSIS — M25.562 CHRONIC PAIN OF LEFT KNEE: Primary | ICD-10-CM

## 2023-09-29 DIAGNOSIS — M25.662 DECREASED RANGE OF MOTION (ROM) OF BOTH KNEES: ICD-10-CM

## 2023-09-29 DIAGNOSIS — M25.561 CHRONIC PAIN OF RIGHT KNEE: ICD-10-CM

## 2023-09-29 PROCEDURE — 97112 NEUROMUSCULAR REEDUCATION: CPT | Mod: CQ

## 2023-09-29 PROCEDURE — 97110 THERAPEUTIC EXERCISES: CPT | Mod: CQ

## 2023-09-29 PROCEDURE — 97530 THERAPEUTIC ACTIVITIES: CPT | Mod: CQ

## 2023-09-29 NOTE — PROGRESS NOTES
[facility]  Physical Therapy Treatment Note    Name: Sherin Zayas  Clinic Number: [id number]    Therapy Diagnosis: No diagnosis found.  Physician: Dimitri Ho, *    Visit Date: 9/29/2023    Physician Orders: PT Eval and Treat  Medical Diagnosis from Referral: M25.562 (ICD-10-CM) - Left knee pain and M25.561 (ICD-10-CM) - Right knee pain  Evaluation Date: 9/19/2023  Authorization Period Expiration: 9/5/2024  Plan of Care Expiration: 10/13/2023  Visit # / Visits authorized: 3/7   PTA Visit #: 2    Time In: 1105  Time Out: 1145  Total Billable Time: 40 minutes    Precautions: Standard and Fall    Subjective     Pt reports: Patient with complaint of pain in her knees and feet this morning that she would rate 6/10.    She was compliant with home exercise program.    Pain: 6/10  Location: bilateral knee      Objective     SHERIN received therapeutic exercises to develop strength, ROM, and flexibility for 12 minutes including:  Nu step: x 5 minutes   Slant board stretch: x 2 minutes   Hamstring stretch on steps: 3 x 20 second holds   Long arc quads: 2 x 10 reps     SHERIN participated in neuromuscular re-education activities to improve: Balance and Coordination for 20 minutes. The following activities were included:  Bilateral quad sets: 2 x 10 reps   Bilateral short arc quads: 2 x 10 reps   Straight leg raises: 2 x 10 reps   Standing heel raises: 2 x 10 reps   Standing hip abduction: 2 x 10 reps     SHERIN participated in dynamic functional therapeutic activities to improve functional performance for 8 minutes, including:  Chair squats: x 10 reps   4 in step up: 2 x 10 reps     SHERIN participated in gait training to improve functional mobility and safety for 0 minutes, including:  Not performed today    Home Exercises Provided and Patient Education Provided     Education provided: no new exercises added this visit     Written Home Exercises Provided: Patient instructed to cont  prior HEP. Exercises were reviewed and PIPER was able to demonstrate them prior to the end of the session.  PIPER demonstrated good  understanding of the education provided.     See EMR under Patient Instructions for exercises provided prior visit.    Assessment     Patient was able to perform all exercises, but did report having pain noted with chair squats. Patient with no other complaints following treatment today.     PIPER isprogressing well towards her goals.   Pt prognosis is Fair.     Pt will continue to benefit from skilled outpatient physical therapy to address the deficits listed in the problem list box on initial evaluation, provide pt/family education, and to maximize pt's level of independence in the home and community environment.     Pt's spiritual, cultural, and educational needs considered and pt agreeable to plan of care and goals.     Anticipated barriers to physical therapy: compliance with home exercise program; chronicity of patient's condition    Goals:    Short Term Goals:  Patient will demonstrate independence with home exercise program to ensure carryover of treatment.  Patient will demonstrate 115 degrees of bilateral knee flexion to improve functional use of bilateral lower extremities  Patient will improve bilateral lower extremity strength to 4/5 to improve independence and safety with bed mobility, transfers, and gait.  Patient will ambulate 150 feet without assistive device with independence with neutral hip alignment and adequate bilateral heel strike to improve independence and safety with gait.   Patient will ascend/descend 5 steps reciprocally with unilateral upper extremity support to improve independence and safety with stair negotiation.  Patient will report a reduction in bilateral knee pain from 8/10 to 7/10 at worst to improve quality of life.      Long Term Goals:  Patient will improve bilateral lower extremity strength to 4+/5 to improve independence and safety with bed  mobility, transfers, and gait.  Patient will ambulate 300 feet without assistive device with independence with normal gait mechanics including up/down curbs and ramps to improve independence and safety with community ambulation.  Patient will ascend/descend 5 steps reciprocally without upper extremity support to improve independence and safety with stair negotiation.  Patient will report a reduction in bilateral knee pain from 8/10 to 6/10 at worst to improve quality of life.     Plan     Will continue with appropriate POC.       Cathi Foss, PTA  9/29/2023

## 2023-10-03 ENCOUNTER — CLINICAL SUPPORT (OUTPATIENT)
Dept: REHABILITATION | Facility: HOSPITAL | Age: 48
End: 2023-10-03

## 2023-10-03 DIAGNOSIS — R29.898 WEAKNESS OF BOTH LOWER EXTREMITIES: ICD-10-CM

## 2023-10-03 DIAGNOSIS — M25.661 DECREASED RANGE OF MOTION (ROM) OF BOTH KNEES: ICD-10-CM

## 2023-10-03 DIAGNOSIS — G89.29 CHRONIC PAIN OF RIGHT KNEE: ICD-10-CM

## 2023-10-03 DIAGNOSIS — M25.561 CHRONIC PAIN OF RIGHT KNEE: ICD-10-CM

## 2023-10-03 DIAGNOSIS — M25.662 DECREASED RANGE OF MOTION (ROM) OF BOTH KNEES: ICD-10-CM

## 2023-10-03 DIAGNOSIS — G89.29 CHRONIC PAIN OF LEFT KNEE: Primary | ICD-10-CM

## 2023-10-03 DIAGNOSIS — M25.562 CHRONIC PAIN OF LEFT KNEE: Primary | ICD-10-CM

## 2023-10-03 PROCEDURE — 97530 THERAPEUTIC ACTIVITIES: CPT | Mod: CQ

## 2023-10-03 PROCEDURE — 97112 NEUROMUSCULAR REEDUCATION: CPT | Mod: CQ

## 2023-10-03 PROCEDURE — 97110 THERAPEUTIC EXERCISES: CPT | Mod: CQ

## 2023-10-03 NOTE — PROGRESS NOTES
[facility]  Physical Therapy Treatment Note    Name: Sherin Zayas  Clinic Number: [id number]    Therapy Diagnosis: No diagnosis found.  Physician: Dimitri Ho, *    Visit Date: 10/3/2023    Physician Orders: PT Eval and Treat  Medical Diagnosis from Referral: M25.562 (ICD-10-CM) - Left knee pain and M25.561 (ICD-10-CM) - Right knee pain  Evaluation Date: 9/19/2023  Authorization Period Expiration: 9/5/2024  Plan of Care Expiration: 10/13/2023  Visit # / Visits authorized: 4/7   PTA Visit #: 3    Time In: 1106  Time Out: 1146  Total Billable Time: 40 minutes    Precautions: Standard and Fall    Subjective     Pt reports: Patient with complaint of 8/10 pain this morning.     She was compliant with home exercise program.    Pain: 8/10  Location: bilateral knee      Objective     SHERIN received therapeutic exercises to develop strength, ROM, and flexibility for 12 minutes including:  Nu step: x 5 minutes   Slant board stretch: x 2 minutes   Hamstring stretch on steps: 3 x 20 second holds   Long arc quads: 2 x 10 reps     SHERIN participated in neuromuscular re-education activities to improve: Balance and Coordination for 20 minutes. The following activities were included:  Bilateral quad sets: 2 x 10 reps   Bilateral short arc quads: 2 x 10 reps   Straight leg raises: 2 x 10 reps   Standing heel raises: 2 x 10 reps   Standing hip abduction: 2 x 10 reps     SHERIN participated in dynamic functional therapeutic activities to improve functional performance for 8 minutes, including:  Chair squats: x 10 reps   4 in step up: 2 x 10 reps     SHERIN participated in gait training to improve functional mobility and safety for 0 minutes, including:  Not performed today    Home Exercises Provided and Patient Education Provided     Education provided: no new exercises added this visit     Written Home Exercises Provided: Patient instructed to cont prior HEP. Exercises were reviewed and  PIPER was able to demonstrate them prior to the end of the session.  PIPER demonstrated good  understanding of the education provided.     See EMR under Patient Instructions for exercises provided prior visit.    Assessment     Patient was able to perform all exercises with no increase of pain to report. Patient with no new complaints and reports to be feeling good following treatment today.     PIPER isprogressing well towards her goals.   Pt prognosis is Fair.     Pt will continue to benefit from skilled outpatient physical therapy to address the deficits listed in the problem list box on initial evaluation, provide pt/family education, and to maximize pt's level of independence in the home and community environment.     Pt's spiritual, cultural, and educational needs considered and pt agreeable to plan of care and goals.     Anticipated barriers to physical therapy: compliance with home exercise program; chronicity of patient's condition     Goals:    Short Term Goals:  Patient will demonstrate independence with home exercise program to ensure carryover of treatment.  Patient will demonstrate 115 degrees of bilateral knee flexion to improve functional use of bilateral lower extremities  Patient will improve bilateral lower extremity strength to 4/5 to improve independence and safety with bed mobility, transfers, and gait.  Patient will ambulate 150 feet without assistive device with independence with neutral hip alignment and adequate bilateral heel strike to improve independence and safety with gait.   Patient will ascend/descend 5 steps reciprocally with unilateral upper extremity support to improve independence and safety with stair negotiation.  Patient will report a reduction in bilateral knee pain from 8/10 to 7/10 at worst to improve quality of life.      Long Term Goals:  Patient will improve bilateral lower extremity strength to 4+/5 to improve independence and safety with bed mobility, transfers, and  gait.  Patient will ambulate 300 feet without assistive device with independence with normal gait mechanics including up/down curbs and ramps to improve independence and safety with community ambulation.  Patient will ascend/descend 5 steps reciprocally without upper extremity support to improve independence and safety with stair negotiation.  Patient will report a reduction in bilateral knee pain from 8/10 to 6/10 at worst to improve quality of life.     Plan     Will continue with appropriate POC.       Cathi Foss, PTA  10/3/2023

## 2023-10-10 ENCOUNTER — CLINICAL SUPPORT (OUTPATIENT)
Dept: REHABILITATION | Facility: HOSPITAL | Age: 48
End: 2023-10-10

## 2023-10-10 DIAGNOSIS — M25.661 DECREASED RANGE OF MOTION (ROM) OF BOTH KNEES: ICD-10-CM

## 2023-10-10 DIAGNOSIS — M25.562 CHRONIC PAIN OF LEFT KNEE: Primary | ICD-10-CM

## 2023-10-10 DIAGNOSIS — G89.29 CHRONIC PAIN OF LEFT KNEE: Primary | ICD-10-CM

## 2023-10-10 DIAGNOSIS — M25.561 CHRONIC PAIN OF RIGHT KNEE: ICD-10-CM

## 2023-10-10 DIAGNOSIS — G89.29 CHRONIC PAIN OF RIGHT KNEE: ICD-10-CM

## 2023-10-10 DIAGNOSIS — M25.662 DECREASED RANGE OF MOTION (ROM) OF BOTH KNEES: ICD-10-CM

## 2023-10-10 DIAGNOSIS — R29.898 WEAKNESS OF BOTH LOWER EXTREMITIES: ICD-10-CM

## 2023-10-10 PROCEDURE — 97530 THERAPEUTIC ACTIVITIES: CPT | Mod: CQ

## 2023-10-10 PROCEDURE — 97112 NEUROMUSCULAR REEDUCATION: CPT | Mod: CQ

## 2023-10-10 PROCEDURE — 97110 THERAPEUTIC EXERCISES: CPT | Mod: CQ

## 2023-10-10 NOTE — PROGRESS NOTES
[facility]  Physical Therapy Treatment Note    Name: Sherin Zayas  Clinic Number: [id number]    Therapy Diagnosis: No diagnosis found.  Physician: Dimitri Ho, *    Visit Date: 10/10/2023    Physician Orders: PT Eval and Treat  Medical Diagnosis from Referral: M25.562 (ICD-10-CM) - Left knee pain and M25.561 (ICD-10-CM) - Right knee pain  Evaluation Date: 9/19/2023  Authorization Period Expiration: 9/5/2024  Plan of Care Expiration: 10/13/2023  Visit # / Visits authorized: 5/7   PTA Visit #: 4    Time In: 1108 (Patient treatment started prior to being checked in)  Time Out: 1148  Total Billable Time: 40 minutes    Precautions: Standard and Fall    Subjective     Pt reports: she couldn't get up at all yesterday because her legs were hurting so much. Patient states she went to the Dr and he shahid fluid off her knees and gave her Cortizone shots recently. Patient states she had a Flu shot last Thursday and thinks that may be why her legs were hurting so bad.    She was compliant with home exercise program.     Pain: 8/10  Location: bilateral knee      Objective     SHERIN received therapeutic exercises to develop strength, ROM, and flexibility for 12 minutes including:    Nu step: x 5 minutes   Slant board stretch: x 2 minutes   Hamstring stretch on steps: 3 x 20 second holds   Long arc quads: 2 x 10 reps     SHERIN participated in neuromuscular re-education activities to improve: Balance and Coordination for 20 minutes. The following activities were included:    Bilateral quad sets: 2 x 10 reps x 3 sec hold  Bilateral short arc quads: 2 x 10 reps   Straight leg raises: 2 x 10 reps   Standing heel raises: 2 x 10 reps   Standing hip abduction: 2 x 10 reps  Forward/backward/lateral walking on foam balance beam: x 3 laps each     SHERIN participated in dynamic functional therapeutic activities to improve functional performance for 8 minutes, including:    Chair squats: 2 x 10  reps   4 in step up: x 15 reps leading with each LE    PIPER participated in gait training to improve functional mobility and safety for 0 minutes, including:  Not performed today    Home Exercises Provided and Patient Education Provided     Education provided: no new exercises added this visit     Written Home Exercises Provided: Patient instructed to cont prior HEP. Exercises were reviewed and PIPER was able to demonstrate them prior to the end of the session.  PIPER demonstrated good  understanding of the education provided.     See EMR under Patient Instructions for exercises provided prior visit.    Assessment     Patient was able to perform all exercises with no increase of pain reported. Patient stated she was tired post treatment, but no other complaints.    PIPER isprogressing well towards her goals.   Pt prognosis is Fair.     Pt will continue to benefit from skilled outpatient physical therapy to address the deficits listed in the problem list box on initial evaluation, provide pt/family education, and to maximize pt's level of independence in the home and community environment.     Pt's spiritual, cultural, and educational needs considered and pt agreeable to plan of care and goals.     Anticipated barriers to physical therapy: compliance with home exercise program; chronicity of patient's condition     Goals:    Short Term Goals:  Patient will demonstrate independence with home exercise program to ensure carryover of treatment.  Patient will demonstrate 115 degrees of bilateral knee flexion to improve functional use of bilateral lower extremities  Patient will improve bilateral lower extremity strength to 4/5 to improve independence and safety with bed mobility, transfers, and gait.  Patient will ambulate 150 feet without assistive device with independence with neutral hip alignment and adequate bilateral heel strike to improve independence and safety with gait.   Patient will ascend/descend 5 steps  reciprocally with unilateral upper extremity support to improve independence and safety with stair negotiation.  Patient will report a reduction in bilateral knee pain from 8/10 to 7/10 at worst to improve quality of life.      Long Term Goals:  Patient will improve bilateral lower extremity strength to 4+/5 to improve independence and safety with bed mobility, transfers, and gait.  Patient will ambulate 300 feet without assistive device with independence with normal gait mechanics including up/down curbs and ramps to improve independence and safety with community ambulation.  Patient will ascend/descend 5 steps reciprocally without upper extremity support to improve independence and safety with stair negotiation.  Patient will report a reduction in bilateral knee pain from 8/10 to 6/10 at worst to improve quality of life.     Plan     Will continue with appropriate POC.       Charlie Mendoza PTA  10/10/2023

## 2024-10-26 ENCOUNTER — HOSPITAL ENCOUNTER (EMERGENCY)
Facility: HOSPITAL | Age: 49
Discharge: HOME | End: 2024-10-26
Payer: COMMERCIAL

## 2024-10-26 VITALS
RESPIRATION RATE: 16 BRPM | WEIGHT: 250 LBS | HEART RATE: 68 BPM | DIASTOLIC BLOOD PRESSURE: 88 MMHG | SYSTOLIC BLOOD PRESSURE: 140 MMHG | OXYGEN SATURATION: 97 % | BODY MASS INDEX: 41.65 KG/M2 | HEIGHT: 65 IN | TEMPERATURE: 98 F

## 2024-10-26 DIAGNOSIS — M54.50 ACUTE RIGHT-SIDED LOW BACK PAIN WITHOUT SCIATICA: Primary | ICD-10-CM

## 2024-10-26 PROCEDURE — 99284 EMERGENCY DEPT VISIT MOD MDM: CPT | Mod: ,,, | Performed by: NURSE PRACTITIONER

## 2024-10-26 PROCEDURE — 96372 THER/PROPH/DIAG INJ SC/IM: CPT | Performed by: NURSE PRACTITIONER

## 2024-10-26 PROCEDURE — 99284 EMERGENCY DEPT VISIT MOD MDM: CPT | Mod: 25

## 2024-10-26 PROCEDURE — 63600175 PHARM REV CODE 636 W HCPCS: Performed by: NURSE PRACTITIONER

## 2024-10-26 RX ORDER — ATORVASTATIN CALCIUM 80 MG/1
80 TABLET, FILM COATED ORAL
COMMUNITY
Start: 2024-06-20

## 2024-10-26 RX ORDER — KETOROLAC TROMETHAMINE 30 MG/ML
30 INJECTION, SOLUTION INTRAMUSCULAR; INTRAVENOUS
Status: COMPLETED | OUTPATIENT
Start: 2024-10-26 | End: 2024-10-26

## 2024-10-26 RX ORDER — FLUOXETINE HYDROCHLORIDE 20 MG/1
20 CAPSULE ORAL
COMMUNITY
Start: 2024-10-22

## 2024-10-26 RX ORDER — ORPHENADRINE CITRATE 30 MG/ML
60 INJECTION INTRAMUSCULAR; INTRAVENOUS
Status: COMPLETED | OUTPATIENT
Start: 2024-10-26 | End: 2024-10-26

## 2024-10-26 RX ORDER — HYDROXYCHLOROQUINE SULFATE 200 MG/1
TABLET, FILM COATED ORAL
COMMUNITY

## 2024-10-26 RX ORDER — HYDROCODONE BITARTRATE AND ACETAMINOPHEN 5; 325 MG/1; MG/1
TABLET ORAL
COMMUNITY

## 2024-10-26 RX ADMIN — ORPHENADRINE CITRATE 60 MG: 60 INJECTION INTRAMUSCULAR; INTRAVENOUS at 03:10

## 2024-10-26 RX ADMIN — KETOROLAC TROMETHAMINE 30 MG: 30 INJECTION, SOLUTION INTRAMUSCULAR at 03:10

## 2025-01-14 DIAGNOSIS — M47.817 LUMBOSACRAL SPONDYLOSIS WITHOUT MYELOPATHY: Primary | ICD-10-CM

## 2025-01-27 ENCOUNTER — CLINICAL SUPPORT (OUTPATIENT)
Dept: REHABILITATION | Facility: HOSPITAL | Age: 50
End: 2025-01-27
Payer: COMMERCIAL

## 2025-01-27 DIAGNOSIS — R29.898 WEAKNESS OF BOTH LOWER EXTREMITIES: Primary | ICD-10-CM

## 2025-01-27 DIAGNOSIS — Z74.09 IMPAIRED FUNCTIONAL MOBILITY, BALANCE, GAIT, AND ENDURANCE: ICD-10-CM

## 2025-01-27 DIAGNOSIS — M47.817 LUMBOSACRAL SPONDYLOSIS WITHOUT MYELOPATHY: ICD-10-CM

## 2025-01-27 PROCEDURE — 97161 PT EVAL LOW COMPLEX 20 MIN: CPT

## 2025-01-27 NOTE — PROGRESS NOTES
Outpatient Rehab    Physical Therapy Evaluation (only)    Patient Name: PIPER Zayas  MRN: [id number]  YOB: 1975  Today's Date: 1/27/2025    Therapy Diagnosis:   Encounter Diagnoses   Name Primary?    Lumbosacral spondylosis without myelopathy     Weakness of both lower extremities Yes    Impaired functional mobility, balance, gait, and endurance      Physician: Page Ahuja*    Physician Orders: Eval and Treat  Medical Diagnosis: M47.817 (ICD-10-CM) - Lumbosacral spondylosis without myelopathy    Visit # / Visits Authorized:  1 / Only initial evaluation approved by [employer]. Subsequent visits require prior authorization.     Date of Evaluation:  1/27/2025   Insurance Authorization Period: initial evaluation approved by [employer]  Plan of Care Certification:  1/27/2025 to 2/28/24      Time In: 1039   Time Out: 1107  Total Time: 28   Total Billable Time: 28         Subjective   History of Present Illness  PIPER is a 50 y.o. female who reports to physical therapy with a chief concern of Lumbosacral Spondylosis. According to the patient's chart, [email or web address] [email or web address] has no past surgical history on file.    The patient reports a medical diagnosis of M47.817 (ICD-10-CM) - Lumbosacral spondylosis without myelopathy. The patient has experienced this issue since 01/27/25.   Diagnostic tests related to this condition: X-ray.   X-Ray Details: Findings:  The vertebral body heights, alignment, disc spaces are preserved. Mild diffuse facet degeneration throughout the lumbar spine with sclerosis and osteophyte formation. Received From: [facility]    Dominant Hand: Right  History of Present Condition/Illness: Patient states her back has been hurting about 3 months ago. She states she just started hurting in her back one day and couldn't get out of bed about 3 days. She then went to the ER and got a shot and then she could move a little bit. She states that's when they took the imaging of her  back. She states she has also had problems with her knees and feet in the past. She did therapy on the knees and states it helped, but her knees are just worn out. She states when she fell and broke both feet this probably did hurt the back some but she didn't pay that any attention because her feet were hurt so bad.     Activities of Daily Living  Social history was obtained from Patient.    General Prior Level of Function Comments: ind  General Current Level of Function Comments: ind       Previously independent with activities of daily living? Yes     Currently independent with activities of daily living? Yes              Pain     Patient reports a current pain level of 7/10. Pain at best is reported as 5/10. Pain at worst is reported as 9/10.   Location: middle lower back all the way down to the feet.  Clinical Progression (since onset): Worsening  Pain Qualities: Aching, Dull  Pain-Relieving Factors: Relaxation, Medications - prescription  Pain-Aggravating Factors: Standing, Walking, Holding objects, Bending         Review of Systems  Patient reports: Cardiac History and Osteoarthritis        Living Arrangements  Living Situation  Housing: Home independently  Living Arrangements: Alone  Support Systems: Children    Home Setup  Type of Structure: House  Home Access: Level entry  Number of Levels in Home: One level  Patient is able to live on main floor of home: Yes        Employment  Patient reports: Does the patient's condition impact their ability to work?  Employment Status: Not employed          Past Medical History/Physical Systems Review:   Sherin Zayas  has a past medical history of Knee pain and Mixed hyperlipidemia.    Sherin Zayas  has no past surgical history on file.    Sherin has a current medication list which includes the following prescription(s): atorvastatin, fluoxetine, hydrocodone-acetaminophen, and hydroxychloroquine.    Review of patient's allergies indicates:  No Known Allergies      Objective   Posture  Patient presents with a Forward head position. Increased lumbar lordosis is observed.   Shoulders are Rounded. Pelvic tilt observed: Anterior              Knee Observations  Right Knee Observations  Not Present: Straight Leg Raise Extensor Lag  Left Knee Observations  Not Present: Straight Leg Raise Extensor Lag            Lower Extremity Sensation  General Lumbar/Lower Extremity Sensation  Intact: Right and Left  Right Lumbar/Lower Extremity Sensation  Intact: Light Touch, Sharp/Dull Discrimination, Static Two Point Discrimination, Dynamic Two Point Discrimination, Kinesthesia, and Proprioception  Right Lumbar/Lower Extremity Sensation Stocking Glove Pattern: No    Left Lumbar/Lower Extremity Sensation  Intact: Light Touch, Static Two Point Discrimination, Dynamic Two Point Discrimination, Sharp/Dull Discrimination, Kinesthesia, and Proprioception  Left Lumbar/Lower Extremity Sensation Stocking Glove Pattern: No             Right Lower Extremity Reflexes  Patellar, L4: Normal (2+)                   Left Lower Extremity Reflexes  Patellar, L4: Normal (2+)                        Spinal Mobility  Hypomobile: Lumbosacral       Spinal Muscle Palpation  Right Spinal Muscle Palpation  Abnormal: Lumbar/Sacral  Right Lumbar/Sacral Muscle Palpation Observations: ttp paraspinals, piriformis       Left Spinal Muscle Palpation  Abnormal: Lumbar/Sacral  Left Lumbar/Sacral Muscle Palpation Observations: ttp paraspinals, piriformis       Hip Palpation  Right Hip Palpation  Abnormal: Hip Muscle and Lumbar/Sacral Muscle  Right Hip Muscle Palpation Observations: piriformis  Right Lumbar/Sacral Muscle Palpation Observations: ttp paraspinals, piriformis       Left Hip Palpation  Abnormal: Hip Muscle and Lumbar/Sacral Muscle  Left Hip Muscle Palpation Observations: piriformis  Left Lumbar/Sacral Muscle Palpation Observations: ttp paraspinals, piriformis             Hip Range of Motion   Right Hip   Active (deg)  Passive (deg) Pain   Flexion 100       Extension         ABduction 15       ADduction         External Rotation 90/90         External Rotation Prone         Internal Rotation 90/90         Internal Rotation Prone             Left Hip   Active (deg) Passive (deg) Pain   Flexion 100       Extension         ABduction 15       ADduction         External Rotation 90/90         External Rotation Prone         Internal Rotation 90/90         Internal Rotation Prone                  Knee Range of Motion   Right Knee   Active (deg) Passive (deg) Pain   Flexion 120       Extension 0           Left Knee   Active (deg) Passive (deg) Pain   Flexion 123       Extension 0                Ankle/Foot Range of Motion   Right Ankle/Foot   Active (deg) Passive (deg) Pain   Dorsiflexion (KE) 0       Dorsiflexion (KF)         Plantar Flexion   74     Ankle Inversion         Ankle Eversion         Subtalar Inversion         Subtalar Eversion         Great Toe MTP Flexion         Great Toe MTP Extension         Great Toe IP Flexion             Left Ankle/Foot   Active (deg) Passive (deg) Pain   Dorsiflexion (KE) -6       Dorsiflexion (KF)         Plantar Flexion 68       Ankle Inversion         Ankle Eversion         Subtalar Inversion         Subtalar Eversion         Great Toe MTP Flexion         Great Toe MTP Extension         Great Toe IP Flexion                            Hip Strength - Planes of Motion   Right Strength Right Pain Left Strength Left  Pain   Flexion (L2) 4   4     Extension 3+   3+     ABduction 4   4     ADduction 4   4     Internal Rotation           External Rotation               Knee Strength   Right Strength Right Pain Left Strength Left  Pain   Flexion (S2) 4   4     Prone Flexion 4   4     Extension (L3) 4   4       Knee Extensor Lag  No Lag: Right and Left       Ankle/Foot Strength - Planes of Motion   Right Strength Right Pain Left Strength Left  Pain   Dorsiflexion (L4) 4+   4+     Plantar Flexion (S1) 4+    4+     Inversion 4+   4+     Eversion 4+   4+     Great Toe Flexion 4+   4+     Great Toe Extension (L5) 4+   4+     Lesser Toes Flexion 4+   4+     Lesser Toes Extension 4+   4+            Hip Special Tests  Nabil Test  Positive: Right     Right Knee Straight Leg Raise Extensor Lag: No  Left Knee Straight Leg Raise Extensor Lag: No    Slump test= Positive bilaterally    Knee Special Tests                  Gait Analysis  Base of Support: Wide  Gait Pattern: Antalgic  Walking Speed: Decreased    Right Side Walking Observations  Decreased: Step Length and Arm Swing  Right Foot Contact Pattern: Heel to toe    Left Side Walking Observations  Decreased: Step Length and Arm Swing  Left Foot Contact Pattern: Heel to toe  Trunk Observations During Gait: Right lateral lean over stance limb and Left lateral lean over stance limb    Knee Observations During Gait  Left: Decreased Knee Extension in Initial Contact  Ankle/Foot Observations During Gait  Bilateral: Pronated Foot         Intake Outcome Measure for FOTO Survey    Therapist reviewed FOTO scores for PIPER Zayas on 1/27/2025.   FOTO report - see Media section or FOTO account episode details.     Intake Score: 48%    Patient's spiritual, cultural, and educational needs considered and patient agreeable to plan of care and goals.     Assessment & Plan   Assessment  PIPER presents with a condition of Low complexity.   Presentation of Symptoms: Stable  Will Comorbidities Impact Care: Yes  Hx of TIA    Functional Limitations: Activity tolerance, Ambulating on uneven surfaces, Carrying objects, Completing self-care activities, Decreased ambulation distance/endurance, Driving, Gait limitations, Pain with ADLs/IADLs, Performing household chores, Range of motion, Sitting tolerance, Standing tolerance, Squatting  Impairments: Abnormal gait, Activity intolerance, Impaired physical strength, Lack of appropriate home exercise program, Pain with functional activity  Personal Factors  Affecting Prognosis: Pain, Motivation    Patient Goal for Therapy (PT): Would like to go and be able to socialize, stand up for longer times.  Prognosis: Fair  Assessment Details: Patient is a 51 y/o female presenting with lumbar pain due to spondylosis with radiculopathy down posterior bilateral legs. She demonstrated decreased lumbar mobility and range of motion, decreased lower extremity functional range of motion, decreased strength, pain and radiculopathy. Will address these deficits with appropriate intervention as tolerated.     Plan  From a physical therapy perspective, the patient would benefit from: Skilled Rehab Services    Planned therapy interventions include: Therapeutic exercise, Therapeutic activities, Neuromuscular re-education, and Manual therapy.    Planned modalities to include: Cryotherapy (cold pack), Electrical stimulation - attended, Electrical stimulation - passive/unattended, Thermotherapy (hot pack), Vasopneumatic pump, and Ultrasound.        Visit Frequency: 2 times Per Week for 4 Weeks.       This plan was discussed with Patient and Therapy assistant.   Discussion participants: Agreed Upon Plan of Care           Clinical Information for Insurance Authorization     Dates of Services: 1/27/25 to 2/28/25  Discharge Plan: Patient will be discharged from skilled physical therapy treatment once all goals have been met, patient has plateaued, or physician/insurance requests discontinuation of care. Patient will be discharged with a home exercise program.   Type of therapy: Rehabilitative  ICD-10 Diagnosis Code(s): M47.817 (ICD-10-CM) - Lumbosacral spondylosis without myelopathy  Which side is symptomatic? Not applicable and/or symptoms are not localized  Surgical: No  Surgical procedure: N/A  Surgery date: N/A.  Presenting symptoms/diagnoses are unspecified in nature.  Presenting symptoms are radiating in nature.   The rehabilitation is not related to a diagnosis of cancer.  The rehabilitation  is not related to a diagnosis of lymphedema.  Patient's clinical presentation is:  Moderate objective and functional deficits: consistent symptoms and/or symptoms that are intensified with activity with moderate loss of range of motion, strength, or ability to perform daily tasks  CPT Codes Requested:  02251 [therapeutic exercise], 37687 [neuromuscular re-education], 47731 [gait training], 22734 [manual therapy], 21413 [therapeutic activities], 29318 [attended electrical stimulation], 04114 [ultrasound], 01496 [unattended electrical stimulation], 92964 [vasopneumatic device], 57918 [needle insertion(s) without injection(s), 1-2 muscles], and 20561 [needle insertion(s) without injection(s), 3 or more muscles]     Goals:   Active       Activity Tolerance       Patient will demonstrate sitting and standing tolerance of 40 minutes in order to improve activity tolerance.        Start:  01/27/25    Expected End:  02/28/25               Functional outcome       Patient stated goal: Would like to go and be able to socialize, stand up for longer times.        Start:  01/27/25    Expected End:  02/28/25            Patient will demonstrate independence in home program for support of progression       Start:  01/27/25    Expected End:  02/28/25               Pain       Patient will report pain of 3/10 demonstrating a reduction of overall pain       Start:  01/27/25    Expected End:  02/28/25               Range of Motion       Patient will achieve bilateral hip flexion  degrees       Start:  01/27/25    Expected End:  02/28/25            Patient will achieve bilateral knee ROM of  0-130 degrees        Start:  01/27/25    Expected End:  02/28/25               Strength       Patient will achieve bilateral hip flexion strength of 5/5       Start:  01/27/25    Expected End:  02/28/25            Patient will achieve bilateral knee flexion strength of 5/5       Start:  01/27/25    Expected End:  02/28/25            Patient  will achieve bilateral knee extension strength of 5/5       Start:  01/27/25    Expected End:  02/28/25              Manjit Nielsen PT, DPT   01/27/2025

## 2025-01-27 NOTE — PATIENT INSTRUCTIONS
Posterior pelvic tilt    -Start by lying supine on the table.  -Knees bent and feet flat down.  -Push pelvis into the table; like you're trying to crush someone's hand.  -Roll pelvis posteriorly.    -Condition and Functionality: Helps with spinal alignment and low back health.  -Muscles: Core, Glutes, Biceps femoris. Repeat 10 Times   Hold 5 Seconds   Complete 3 Sets          LOWER TRUNK ROTATIONS - LTR - WIG WAGS - KNEE ROCKS    Lying on your back with your knees bent, gently rotate your spine as you move your knees to the side and then reverse directions and move your knees to the other side. Repeat as you move through a comfortable range of motion.    Video # [id number] Repeat 10 Times   Hold 5 Seconds   Complete 2 Sets   Perform 1 Times a Day          SINGLE KNEE TO CHEST STRETCH WITH TOWEL - SKTC    Lie on your back and place a towel around the undersurface of your thigh. Hold both ends of the towel and use your arms to pull up your leg into hip flexion for a gentle stretch. Return to starting position and repeat.    Video # [id number] Repeat 5 Times   Hold 20 Seconds   Complete 1 Set   Perform 1 Times a Day          Bridge    Began by lying on a flat surface with your knees bent so that your feet lie flat on the surface. Pull in your tummy and tuck your hips, lifting your pelvis from the surface. Hold this position for 6 seconds, then return to lying flat. Repeat 10 Times   Hold 5 Seconds   Complete 3 Sets   Perform 1 Times a Day

## 2025-02-04 ENCOUNTER — CLINICAL SUPPORT (OUTPATIENT)
Dept: REHABILITATION | Facility: HOSPITAL | Age: 50
End: 2025-02-04
Payer: COMMERCIAL

## 2025-02-04 DIAGNOSIS — R29.898 WEAKNESS OF BOTH LOWER EXTREMITIES: ICD-10-CM

## 2025-02-04 DIAGNOSIS — Z74.09 IMPAIRED FUNCTIONAL MOBILITY, BALANCE, GAIT, AND ENDURANCE: ICD-10-CM

## 2025-02-04 DIAGNOSIS — M47.817 LUMBOSACRAL SPONDYLOSIS WITHOUT MYELOPATHY: Primary | ICD-10-CM

## 2025-02-04 PROCEDURE — 97110 THERAPEUTIC EXERCISES: CPT | Mod: CQ

## 2025-02-04 PROCEDURE — 97112 NEUROMUSCULAR REEDUCATION: CPT | Mod: CQ

## 2025-02-04 NOTE — PROGRESS NOTES
Outpatient Rehab    Physical Therapy Visit    Patient Name: PIPER Zayas  MRN: [id number]  YOB: 1975  Today's Date: 2/4/2025    Therapy Diagnosis:   Encounter Diagnoses   Name Primary?    Lumbosacral spondylosis without myelopathy Yes    Impaired functional mobility, balance, gait, and endurance     Weakness of both lower extremities      Physician: Page Ahuja*    Physician Orders: Eval and Treat  Medical Diagnosis: M47.817 (ICD-10-CM) - Lumbosacral spondylosis without myelopathy     Visit # / Visits Authorized:  1 / 10    Date of Evaluation:  1/27/2025   Insurance Authorization Period: initial evaluation approved by [facility]an  Plan of Care Certification:  1/27/2025 to 2/28/24   PTA Visit: 1     Time In: 1020   Time Out: 1105  Total Time: 45   Total Billable Time: 45 minutes         Subjective   Patient states she is doing okay; she is having pain in her left lateral knee that she rates as 8/10.  Pain reported as 8/10. left knee    Objective            Treatment:  Therapeutic Exercise  Therapeutic Exercise Activity 1: NuStep: 6 minutes  Therapeutic Exercise Activity 2: Calf stretch on slant board: 2 minutes  Therapeutic Exercise Activity 3: Hamstring stretch at steps: 3 x 20 sec hold each  Therapeutic Exercise Activity 4: Lower trunk rotations: 10 x 10 sh  Therapeutic Exercise Activity 5: Single knee to chest stretch: 3 x 20 sec hold  Therapeutic Exercise Activity 6: Hooklying hip abduction: x 20 reps; RTB  Therapeutic Exercise Activity 7: Hamstring curls on swiss in supine: x 20 reps  Therapeutic Exercise Activity 8: Seated theraball rollouts into trunk flexion: 10 reps x 10 sec hold    Balance/Neuromuscular Re-Education  Balance/Neuromuscular Re-Education Activity 1: Posterior pelvic tilts: x 20 reps x 3 sh  Balance/Neuromuscular Re-Education Activity 2: Supine marching with pelvic tilt: x 15 reps  Balance/Neuromuscular Re-Education Activity 3: SLR with pelvic tilt: 2 x 10 reps  each  Balance/Neuromuscular Re-Education Activity 4: Bridges: x 10 reps (unable to do more due to pain in back and left leg)    Patient's spiritual, cultural, and educational needs considered and patient agreeable to plan of care and goals.     Assessment & Plan   Assessment: Patient with increased pain in left knee upon arrival. She had good effort throughout session. She was able to complete most interventions with good tolerance of each and no complaints. She did have difficulty performing bridges today due to increased pain in back and left leg. She did not have any complaints post treatment. Will continue to progress patient as appropriate and as tolerated.  Evaluation/Treatment Tolerance: Patient tolerated treatment well        Plan: Will continue with POC as appropriate    Goals:   Active       Activity Tolerance       Patient will demonstrate sitting and standing tolerance of 40 minutes in order to improve activity tolerance.  (Progressing)       Start:  01/27/25    Expected End:  02/28/25               Functional outcome       Patient stated goal: Would like to go and be able to socialize, stand up for longer times.  (Progressing)       Start:  01/27/25    Expected End:  02/28/25            Patient will demonstrate independence in home program for support of progression (Progressing)       Start:  01/27/25    Expected End:  02/28/25               Pain       Patient will report pain of 3/10 demonstrating a reduction of overall pain (Progressing)       Start:  01/27/25    Expected End:  02/28/25               Range of Motion       Patient will achieve bilateral hip flexion  degrees (Progressing)       Start:  01/27/25    Expected End:  02/28/25            Patient will achieve bilateral knee ROM of  0-130 degrees  (Progressing)       Start:  01/27/25    Expected End:  02/28/25               Strength       Patient will achieve bilateral hip flexion strength of 5/5 (Progressing)       Start:  01/27/25     Expected End:  02/28/25            Patient will achieve bilateral knee flexion strength of 5/5 (Progressing)       Start:  01/27/25    Expected End:  02/28/25            Patient will achieve bilateral knee extension strength of 5/5 (Progressing)       Start:  01/27/25    Expected End:  02/28/25                Charlie Mendoza PTA

## 2025-02-06 ENCOUNTER — CLINICAL SUPPORT (OUTPATIENT)
Dept: REHABILITATION | Facility: HOSPITAL | Age: 50
End: 2025-02-06
Payer: COMMERCIAL

## 2025-02-06 DIAGNOSIS — Z74.09 IMPAIRED FUNCTIONAL MOBILITY, BALANCE, GAIT, AND ENDURANCE: ICD-10-CM

## 2025-02-06 DIAGNOSIS — M47.817 LUMBOSACRAL SPONDYLOSIS WITHOUT MYELOPATHY: ICD-10-CM

## 2025-02-06 DIAGNOSIS — R29.898 WEAKNESS OF BOTH LOWER EXTREMITIES: Primary | ICD-10-CM

## 2025-02-06 PROCEDURE — 97110 THERAPEUTIC EXERCISES: CPT | Mod: CQ

## 2025-02-06 PROCEDURE — 97112 NEUROMUSCULAR REEDUCATION: CPT | Mod: CQ

## 2025-02-06 NOTE — PROGRESS NOTES
"  Outpatient Rehab    Physical Therapy Visit    Patient Name: PIPER Zayas  MRN: [id number]  YOB: 1975  Today's Date: 2/6/2025    Therapy Diagnosis:   Encounter Diagnoses   Name Primary?    Weakness of both lower extremities Yes    Lumbosacral spondylosis without myelopathy     Impaired functional mobility, balance, gait, and endurance      Physician: Page Ahuja*    Physician Orders: Eval and Treat  Medical Diagnosis: M47.817 (ICD-10-CM) - Lumbosacral spondylosis without myelopathy     Visit # / Visits Authorized:  2 / 10    Date of Evaluation:  1/27/2025   Insurance Authorization Period: initial evaluation approved by [employer]an  Plan of Care Certification:  1/27/2025 to 2/28/24   PTA Visit: 2     Time In: 1015   Time Out: 1053  Total Time: 38   Total Billable Time: 38 minutes         Subjective   Patient states she is doing fine today with minimal pain stating "It isn't bad today".  Pain reported as 2/10.      Objective            Treatment:  Therapeutic Exercise  Therapeutic Exercise Activity 1: NuStep: 6 minutes  Therapeutic Exercise Activity 2: Calf stretch on slant board: 2 minutes  Therapeutic Exercise Activity 3: Hamstring stretch at steps: 3 x 20 sec hold each  Therapeutic Exercise Activity 4: Lower trunk rotations: 10 x 10 sh  Therapeutic Exercise Activity 5: Single knee to chest stretch: 3 x 20 sec hold  Therapeutic Exercise Activity 6: Hooklying hip abduction: x 20 reps; RTB  Therapeutic Exercise Activity 7: Hamstring curls on swiss in supine: x 20 reps    Balance/Neuromuscular Re-Education  Balance/Neuromuscular Re-Education Activity 1: Posterior pelvic tilts: x 20 reps x 3 sh  Balance/Neuromuscular Re-Education Activity 2: Supine marching with pelvic tilt: x 15 reps  Balance/Neuromuscular Re-Education Activity 3: SLR with pelvic tilt: 2 x 10 reps each    Patient's spiritual, cultural, and educational needs considered and patient agreeable to plan of care and goals. "     Assessment & Plan   Assessment: Patient with decreased pain since last visit. She requested to not do the bridges today. She was able to perform al otherl interventions with good tolerance of each and no complaints of increase in pain noted. No complaints post treatment. WIll continue to progress patient as tolerated.  Evaluation/Treatment Tolerance: Patient tolerated treatment well        Plan: Will continue with POC as appropriate    Goals:   Active       Activity Tolerance       Patient will demonstrate sitting and standing tolerance of 40 minutes in order to improve activity tolerance.  (Progressing)       Start:  01/27/25    Expected End:  02/28/25               Functional outcome       Patient stated goal: Would like to go and be able to socialize, stand up for longer times.  (Progressing)       Start:  01/27/25    Expected End:  02/28/25            Patient will demonstrate independence in home program for support of progression (Progressing)       Start:  01/27/25    Expected End:  02/28/25               Pain       Patient will report pain of 3/10 demonstrating a reduction of overall pain (Progressing)       Start:  01/27/25    Expected End:  02/28/25               Range of Motion       Patient will achieve bilateral hip flexion  degrees (Progressing)       Start:  01/27/25    Expected End:  02/28/25            Patient will achieve bilateral knee ROM of  0-130 degrees  (Progressing)       Start:  01/27/25    Expected End:  02/28/25               Strength       Patient will achieve bilateral hip flexion strength of 5/5 (Progressing)       Start:  01/27/25    Expected End:  02/28/25            Patient will achieve bilateral knee flexion strength of 5/5 (Progressing)       Start:  01/27/25    Expected End:  02/28/25            Patient will achieve bilateral knee extension strength of 5/5 (Progressing)       Start:  01/27/25    Expected End:  02/28/25                Charlie Mendoza PTA

## 2025-02-11 ENCOUNTER — CLINICAL SUPPORT (OUTPATIENT)
Dept: REHABILITATION | Facility: HOSPITAL | Age: 50
End: 2025-02-11
Payer: COMMERCIAL

## 2025-02-11 DIAGNOSIS — R29.898 WEAKNESS OF BOTH LOWER EXTREMITIES: Primary | ICD-10-CM

## 2025-02-11 DIAGNOSIS — M47.817 LUMBOSACRAL SPONDYLOSIS WITHOUT MYELOPATHY: ICD-10-CM

## 2025-02-11 DIAGNOSIS — Z74.09 IMPAIRED FUNCTIONAL MOBILITY, BALANCE, GAIT, AND ENDURANCE: ICD-10-CM

## 2025-02-11 PROCEDURE — 97112 NEUROMUSCULAR REEDUCATION: CPT | Mod: CQ

## 2025-02-11 PROCEDURE — 97110 THERAPEUTIC EXERCISES: CPT | Mod: CQ

## 2025-02-11 NOTE — PROGRESS NOTES
Outpatient Rehab    Physical Therapy Visit    Patient Name: PIPER Zayas  MRN: [id number]  YOB: 1975  Today's Date: 2/11/2025    Therapy Diagnosis:   Encounter Diagnoses   Name Primary?    Weakness of both lower extremities Yes    Lumbosacral spondylosis without myelopathy     Impaired functional mobility, balance, gait, and endurance      Physician: Page Ahuja*    Physician Orders: Eval and Treat  Medical Diagnosis: M47.817 (ICD-10-CM) - Lumbosacral spondylosis without myelopathy     Visit # / Visits Authorized:  3 / 10    Date of Evaluation:  1/27/2025   Insurance Authorization Period: 4/27/25  Plan of Care Certification:  1/27/2025 to 2/28/24   PTA Visit: 3     Time In: 1017   Time Out: 1056  Total Time: 39   Total Billable Time: 39 minutes    FOTO:  Intake Score:  %  Survey Score 1:  %  Survey Score 2:  %         Subjective   Patient states she woke up with increased pain in her low back this morning.  Pain reported as 8/10. low back    Objective            Treatment:  Therapeutic Exercise  Therapeutic Exercise Activity 1: NuStep: 6 minutes  Therapeutic Exercise Activity 2: Calf stretch on slant board: 2 minutes  Therapeutic Exercise Activity 3: Hamstring stretch at steps: 3 x 20 sec hold each  Therapeutic Exercise Activity 4: Lower trunk rotations: 10 x 10 sh  Therapeutic Exercise Activity 5: Single knee to chest stretch: 3 x 20 sec hold  Therapeutic Exercise Activity 6: Hooklying hip abduction: x 20 reps; GTB  Therapeutic Exercise Activity 7: Hamstring curls on swiss in supine: x 20 reps  Therapeutic Exercise Activity 8: Seated theraball rollouts into trunk flexion: 10 reps x 10 sec hold    Balance/Neuromuscular Re-Education  Balance/Neuromuscular Re-Education Activity 1: Posterior pelvic tilts: x 20 reps x 3 sh  Balance/Neuromuscular Re-Education Activity 2: Supine marching with pelvic tilt: x 15 reps  Balance/Neuromuscular Re-Education Activity 3: SLR with pelvic tilt: 2 x 10 reps  each    Assessment & Plan   Assessment: Patient with increased pain upon arrival, rating as 8/10. She was able to perform a continuation of interventions from previous treatment with good tolerance of each. She had good tolerance of added resistance to hooklying hip abduction this session. She had no complaints during or post treatment. WIll continue to progress patient as tolerated.  Evaluation/Treatment Tolerance: Patient tolerated treatment well    Patient will continue to benefit from skilled outpatient physical therapy to address the deficits listed in the problem list box on initial evaluation, provide pt/family education and to maximize pt's level of independence in the home and community environment.     Patient's spiritual, cultural, and educational needs considered and patient agreeable to plan of care and goals.           Plan: Will continue with POC as appropriate    Goals:   Active       Activity Tolerance       Patient will demonstrate sitting and standing tolerance of 40 minutes in order to improve activity tolerance.  (Progressing)       Start:  01/27/25    Expected End:  02/28/25               Functional outcome       Patient stated goal: Would like to go and be able to socialize, stand up for longer times.  (Progressing)       Start:  01/27/25    Expected End:  02/28/25            Patient will demonstrate independence in home program for support of progression (Progressing)       Start:  01/27/25    Expected End:  02/28/25               Pain       Patient will report pain of 3/10 demonstrating a reduction of overall pain (Progressing)       Start:  01/27/25    Expected End:  02/28/25               Range of Motion       Patient will achieve bilateral hip flexion  degrees (Progressing)       Start:  01/27/25    Expected End:  02/28/25            Patient will achieve bilateral knee ROM of  0-130 degrees  (Progressing)       Start:  01/27/25    Expected End:  02/28/25               Strength        Patient will achieve bilateral hip flexion strength of 5/5 (Progressing)       Start:  01/27/25    Expected End:  02/28/25            Patient will achieve bilateral knee flexion strength of 5/5 (Progressing)       Start:  01/27/25    Expected End:  02/28/25            Patient will achieve bilateral knee extension strength of 5/5 (Progressing)       Start:  01/27/25    Expected End:  02/28/25                Charlie Mendoza PTA, LPTA

## 2025-02-13 ENCOUNTER — CLINICAL SUPPORT (OUTPATIENT)
Dept: REHABILITATION | Facility: HOSPITAL | Age: 50
End: 2025-02-13
Payer: COMMERCIAL

## 2025-02-13 DIAGNOSIS — M47.817 LUMBOSACRAL SPONDYLOSIS WITHOUT MYELOPATHY: ICD-10-CM

## 2025-02-13 DIAGNOSIS — R29.898 WEAKNESS OF BOTH LOWER EXTREMITIES: Primary | ICD-10-CM

## 2025-02-13 DIAGNOSIS — Z74.09 IMPAIRED FUNCTIONAL MOBILITY, BALANCE, GAIT, AND ENDURANCE: ICD-10-CM

## 2025-02-13 PROCEDURE — 97112 NEUROMUSCULAR REEDUCATION: CPT | Mod: CQ

## 2025-02-13 PROCEDURE — 97110 THERAPEUTIC EXERCISES: CPT | Mod: CQ

## 2025-02-13 NOTE — PROGRESS NOTES
Outpatient Rehab    Physical Therapy Visit    Patient Name: PIPER Zayas  MRN: [id number]  YOB: 1975  Today's Date: 2/13/2025    Therapy Diagnosis:   Encounter Diagnoses   Name Primary?    Weakness of both lower extremities Yes    Lumbosacral spondylosis without myelopathy     Impaired functional mobility, balance, gait, and endurance      Physician: Page Ahuaj*    Physician Orders: Eval and Treat  Medical Diagnosis: M47.817 (ICD-10-CM) - Lumbosacral spondylosis without myelopathy     Visit # / Visits Authorized:  4 / 10    Date of Evaluation:  1/27/2025   Insurance Authorization Period: 4/27/25  Plan of Care Certification:  1/27/2025 to 2/28/24   PTA Visit: 4     Time In: 1021   Time Out: 1046  Total Time: 25   Total Billable Time: 25 minutes    FOTO:  Intake Score:  %  Survey Score 1:  %  Survey Score 2:  %         Subjective   Patient states she is doing okay today; she arrived 6 minutes late for appointment time stating she needed to leave early today because she has to go  her grandkids.         Objective            Treatment:  Therapeutic Exercise  Therapeutic Exercise Activity 1: NuStep: 5 minutes  Therapeutic Exercise Activity 4: Lower trunk rotations: 10 x 10 sh  Therapeutic Exercise Activity 5: Single knee to chest stretch: 3 x 20 sec hold  Therapeutic Exercise Activity 6: Hooklying hip abduction: x 20 reps; GTB  Therapeutic Exercise Activity 7: Hamstring curls on swiss in supine: x 20 reps    Balance/Neuromuscular Re-Education  Balance/Neuromuscular Re-Education Activity 1: Posterior pelvic tilts: x 20 reps x 3 sh  Balance/Neuromuscular Re-Education Activity 2: Supine marching with pelvic tilt: x 15 reps  Balance/Neuromuscular Re-Education Activity 3: SLR with pelvic tilt: 2 x 10 reps each    Assessment & Plan   Assessment: Patient arrived to treatment 6 minutes late and stated she had to leave session early due to having to  her grandkids. She was able to perform  all exercises, we were able to get to in abbreviated session, with no complaints of increase in pain. She had no complaints post treatment. Will continue to progress patient as tolerated.  Evaluation/Treatment Tolerance: Patient tolerated treatment well    Patient will continue to benefit from skilled outpatient physical therapy to address the deficits listed in the problem list box on initial evaluation, provide pt/family education and to maximize pt's level of independence in the home and community environment.     Patient's spiritual, cultural, and educational needs considered and patient agreeable to plan of care and goals.           Plan: Will continue with POC as appropriate    Goals:   Active       Activity Tolerance       Patient will demonstrate sitting and standing tolerance of 40 minutes in order to improve activity tolerance.  (Progressing)       Start:  01/27/25    Expected End:  02/28/25               Functional outcome       Patient stated goal: Would like to go and be able to socialize, stand up for longer times.  (Progressing)       Start:  01/27/25    Expected End:  02/28/25            Patient will demonstrate independence in home program for support of progression (Progressing)       Start:  01/27/25    Expected End:  02/28/25               Pain       Patient will report pain of 3/10 demonstrating a reduction of overall pain (Progressing)       Start:  01/27/25    Expected End:  02/28/25               Range of Motion       Patient will achieve bilateral hip flexion  degrees (Progressing)       Start:  01/27/25    Expected End:  02/28/25            Patient will achieve bilateral knee ROM of  0-130 degrees  (Progressing)       Start:  01/27/25    Expected End:  02/28/25               Strength       Patient will achieve bilateral hip flexion strength of 5/5 (Progressing)       Start:  01/27/25    Expected End:  02/28/25            Patient will achieve bilateral knee flexion strength of 5/5  (Progressing)       Start:  01/27/25    Expected End:  02/28/25            Patient will achieve bilateral knee extension strength of 5/5 (Progressing)       Start:  01/27/25    Expected End:  02/28/25                Charlie Mendoza PTA, LPTA

## 2025-02-18 ENCOUNTER — CLINICAL SUPPORT (OUTPATIENT)
Dept: REHABILITATION | Facility: HOSPITAL | Age: 50
End: 2025-02-18
Payer: COMMERCIAL

## 2025-02-18 DIAGNOSIS — Z74.09 IMPAIRED FUNCTIONAL MOBILITY, BALANCE, GAIT, AND ENDURANCE: ICD-10-CM

## 2025-02-18 DIAGNOSIS — R29.898 WEAKNESS OF BOTH LOWER EXTREMITIES: Primary | ICD-10-CM

## 2025-02-18 DIAGNOSIS — M47.817 LUMBOSACRAL SPONDYLOSIS WITHOUT MYELOPATHY: ICD-10-CM

## 2025-02-18 PROCEDURE — 97112 NEUROMUSCULAR REEDUCATION: CPT | Mod: CQ

## 2025-02-18 PROCEDURE — 97110 THERAPEUTIC EXERCISES: CPT | Mod: CQ

## 2025-02-18 NOTE — PROGRESS NOTES
Outpatient Rehab    Physical Therapy Visit    Patient Name: PIPER Zayas  MRN: [id number]  YOB: 1975  Today's Date: 2/18/2025    Therapy Diagnosis:   Encounter Diagnoses   Name Primary?    Weakness of both lower extremities Yes    Lumbosacral spondylosis without myelopathy     Impaired functional mobility, balance, gait, and endurance      Physician: Page Ahuja*    Physician Orders: Eval and Treat  Medical Diagnosis: M47.817 (ICD-10-CM) - Lumbosacral spondylosis without myelopathy     Visit # / Visits Authorized:  5 / 10    Date of Evaluation:  1/27/2025   Insurance Authorization Period: 4/27/25  Plan of Care Certification:  1/27/2025 to 2/28/24   PTA Visit: 5     Time In: 1018   Time Out: 1058  Total Time: 40   Total Billable Time: 40 minutes    FOTO:  Intake Score:  %  Survey Score 1:  %  Survey Score 2:  %         Subjective   Patient states her left knee is more painful than her low back upon arrival.  Pain reported as 7/10.      Objective          Treatment:  Therapeutic Exercise  Therapeutic Exercise Activity 1: NuStep: 5 minutes  Therapeutic Exercise Activity 2: Calf stretch on slant board: 2 minutes  Therapeutic Exercise Activity 3: Hamstring stretch at steps: 3 x 20 sec hold each  Therapeutic Exercise Activity 4: Lower trunk rotations: 10 reps x 10 sh  Therapeutic Exercise Activity 5: Single knee to chest stretch: 3 x 20 sec hold  Therapeutic Exercise Activity 6: Hooklying hip abduction: x 20 reps; GTB  Therapeutic Exercise Activity 7: Hamstring curls on swiss in supine: x 20 reps  Therapeutic Exercise Activity 8: Seated theraball rollouts into trunk flexion: 10 reps x 10 sec hold    Balance/Neuromuscular Re-Education  Balance/Neuromuscular Re-Education Activity 1: Posterior pelvic tilts: x 20 reps x 3 sh  Balance/Neuromuscular Re-Education Activity 2: Supine marching with pelvic tilt: x 15 reps  Balance/Neuromuscular Re-Education Activity 3: SLR with pelvic tilt: 2 x 10 reps  each    Assessment & Plan   Assessment: Patient with c/o pain in left knee more than back today. She performed all interventions with good tolerance of each and no complaints today. No complaints during or after treatment. Will continue to progress patient as tolerated.  Evaluation/Treatment Tolerance: Patient tolerated treatment well    Patient will continue to benefit from skilled outpatient physical therapy to address the deficits listed in the problem list box on initial evaluation, provide pt/family education and to maximize pt's level of independence in the home and community environment.     Patient's spiritual, cultural, and educational needs considered and patient agreeable to plan of care and goals.           Plan: Will continue with POC as appropriate    Goals:   Active       Activity Tolerance       Patient will demonstrate sitting and standing tolerance of 40 minutes in order to improve activity tolerance.  (Progressing)       Start:  01/27/25    Expected End:  02/28/25               Functional outcome       Patient stated goal: Would like to go and be able to socialize, stand up for longer times.  (Progressing)       Start:  01/27/25    Expected End:  02/28/25            Patient will demonstrate independence in home program for support of progression (Progressing)       Start:  01/27/25    Expected End:  02/28/25               Pain       Patient will report pain of 3/10 demonstrating a reduction of overall pain (Progressing)       Start:  01/27/25    Expected End:  02/28/25               Range of Motion       Patient will achieve bilateral hip flexion  degrees (Progressing)       Start:  01/27/25    Expected End:  02/28/25            Patient will achieve bilateral knee ROM of  0-130 degrees  (Progressing)       Start:  01/27/25    Expected End:  02/28/25               Strength       Patient will achieve bilateral hip flexion strength of 5/5 (Progressing)       Start:  01/27/25    Expected End:   02/28/25            Patient will achieve bilateral knee flexion strength of 5/5 (Progressing)       Start:  01/27/25    Expected End:  02/28/25            Patient will achieve bilateral knee extension strength of 5/5 (Progressing)       Start:  01/27/25    Expected End:  02/28/25                Charlie Mendoza PTA, LPTA

## 2025-02-20 ENCOUNTER — CLINICAL SUPPORT (OUTPATIENT)
Dept: REHABILITATION | Facility: HOSPITAL | Age: 50
End: 2025-02-20
Payer: COMMERCIAL

## 2025-02-20 DIAGNOSIS — M47.817 LUMBOSACRAL SPONDYLOSIS WITHOUT MYELOPATHY: ICD-10-CM

## 2025-02-20 DIAGNOSIS — R29.898 WEAKNESS OF BOTH LOWER EXTREMITIES: Primary | ICD-10-CM

## 2025-02-20 DIAGNOSIS — Z74.09 IMPAIRED FUNCTIONAL MOBILITY, BALANCE, GAIT, AND ENDURANCE: ICD-10-CM

## 2025-02-20 PROCEDURE — 97110 THERAPEUTIC EXERCISES: CPT

## 2025-02-20 PROCEDURE — 97112 NEUROMUSCULAR REEDUCATION: CPT

## 2025-02-20 PROCEDURE — 97530 THERAPEUTIC ACTIVITIES: CPT

## 2025-02-20 NOTE — PROGRESS NOTES
Outpatient Rehab    Physical Therapy Visit    Patient Name: PIPER Zayas  MRN: [id number]  YOB: 1975  Today's Date: 2/20/2025    Therapy Diagnosis:   Encounter Diagnoses   Name Primary?    Weakness of both lower extremities Yes    Lumbosacral spondylosis without myelopathy     Impaired functional mobility, balance, gait, and endurance      Physician: Page Ahuja*    Physician Orders: Eval and Treat  Medical Diagnosis: M47.817 (ICD-10-CM) - Lumbosacral spondylosis without myelopathy     Visit # / Visits Authorized:  6 / 10    Date of Evaluation:  1/27/2025   Insurance Authorization Period: 4/27/25  Plan of Care Certification:  1/27/2025 to 2/28/24   PTA Visit: 0     Time In: 1019   Time Out: 1057  Total Time: 38   Total Billable Time: 38         Subjective   Patient states her legs hurt some but she is ok.  Pain reported as 6/10.      Objective            Treatment:  Therapeutic Exercise  Therapeutic Exercise Activity 1: Recumbent Bike: 5 minutes  Therapeutic Exercise Activity 2: Calf stretch on slant board: 2 minutes  Therapeutic Exercise Activity 3: Hamstring stretch: 3 x 20 sec hold each  Therapeutic Exercise Activity 4: Lower trunk rotations: 10 reps x 10 sh  Therapeutic Exercise Activity 5: Single knee to chest stretch: 3 x 20 sec hold  Therapeutic Exercise Activity 8: Seated theraball rollouts into trunk flexion: 10 reps x 10 sec hold    Balance/Neuromuscular Re-Education  Balance/Neuromuscular Re-Education Activity 1: Posterior pelvic tilts: x 20 reps x 3 sh  Balance/Neuromuscular Re-Education Activity 2: Supine marching with pelvic tilt: x 15 reps  Balance/Neuromuscular Re-Education Activity 3: SLR with pelvic tilt: 2 x 10 reps each    Therapeutic Activity  Therapeutic Activity 1: step ups 6 in x 20  Therapeutic Activity 2: lateral step up 6 in x 20  Therapeutic Activity 3: mini squat x 20    Assessment & Plan   Assessment: Patient still reported lower extremity pain today which did  not hinder her during activities. She completed all with no complaints of increased pain. She tolerated introduction of Functional strengthening mimicking adl well. Will continue progressing as tolerated  Evaluation/Treatment Tolerance: Patient tolerated treatment well    Patient will continue to benefit from skilled outpatient physical therapy to address the deficits listed in the problem list box on initial evaluation, provide pt/family education and to maximize pt's level of independence in the home and community environment.     Patient's spiritual, cultural, and educational needs considered and patient agreeable to plan of care and goals.           Plan: Patient will continue to benefit from skilled therapy services as tolerated.    Goals:   Active       Activity Tolerance       Patient will demonstrate sitting and standing tolerance of 40 minutes in order to improve activity tolerance.  (Progressing)       Start:  01/27/25    Expected End:  02/28/25               Functional outcome       Patient stated goal: Would like to go and be able to socialize, stand up for longer times.  (Progressing)       Start:  01/27/25    Expected End:  02/28/25            Patient will demonstrate independence in home program for support of progression (Progressing)       Start:  01/27/25    Expected End:  02/28/25               Pain       Patient will report pain of 3/10 demonstrating a reduction of overall pain (Progressing)       Start:  01/27/25    Expected End:  02/28/25               Range of Motion       Patient will achieve bilateral hip flexion  degrees (Progressing)       Start:  01/27/25    Expected End:  02/28/25            Patient will achieve bilateral knee ROM of  0-130 degrees  (Progressing)       Start:  01/27/25    Expected End:  02/28/25               Strength       Patient will achieve bilateral hip flexion strength of 5/5 (Progressing)       Start:  01/27/25    Expected End:  02/28/25            Patient  will achieve bilateral knee flexion strength of 5/5 (Progressing)       Start:  01/27/25    Expected End:  02/28/25            Patient will achieve bilateral knee extension strength of 5/5 (Progressing)       Start:  01/27/25    Expected End:  02/28/25                Manjit Nielsen, PT

## 2025-02-25 ENCOUNTER — CLINICAL SUPPORT (OUTPATIENT)
Dept: REHABILITATION | Facility: HOSPITAL | Age: 50
End: 2025-02-25
Payer: COMMERCIAL

## 2025-02-25 DIAGNOSIS — R29.898 WEAKNESS OF BOTH LOWER EXTREMITIES: Primary | ICD-10-CM

## 2025-02-25 DIAGNOSIS — Z74.09 IMPAIRED FUNCTIONAL MOBILITY, BALANCE, GAIT, AND ENDURANCE: ICD-10-CM

## 2025-02-25 DIAGNOSIS — M47.817 LUMBOSACRAL SPONDYLOSIS WITHOUT MYELOPATHY: ICD-10-CM

## 2025-02-25 PROCEDURE — 97112 NEUROMUSCULAR REEDUCATION: CPT

## 2025-02-25 PROCEDURE — 97530 THERAPEUTIC ACTIVITIES: CPT

## 2025-02-25 PROCEDURE — 97110 THERAPEUTIC EXERCISES: CPT

## 2025-02-25 NOTE — PROGRESS NOTES
Outpatient Rehab    Physical Therapy Visit    Patient Name: PIPER Zayas  MRN: [id number]  YOB: 1975  Encounter Date: 2/25/2025    Therapy Diagnosis: No diagnosis found.  Physician: Page Ahuja*    Physician Orders: Eval and Treat  Medical Diagnosis: M47.817 (ICD-10-CM) - Lumbosacral spondylosis without myelopathy     Visit # / Visits Authorized:  7 / 10    Date of Evaluation:  1/27/2025   Insurance Authorization Period: 4/27/25  Plan of Care Certification:  1/27/2025 to 2/28/24   PTA Visit: 0     Time In: 1114   Time Out: 1145  Total Time: 31   Total Billable Time: 31         Subjective   Patient states her back hurts a little bit..  Pain reported as 7/10.      Objective            Treatment:  Therapeutic Exercise  Therapeutic Exercise Activity 1: Nustep: 6 min lvl 2.5  Therapeutic Exercise Activity 2: Calf stretch on slant board: 2 minutes  Therapeutic Exercise Activity 3: Hamstring stretch: 3 x 20 sec hold each  Therapeutic Exercise Activity 4: Lower trunk rotations: 10 reps x 10 sh  Therapeutic Exercise Activity 5: Single knee to chest stretch: 3 x 20 sec hold  Therapeutic Exercise Activity 6: Hip abd/add: x 20 reps; GTB  Therapeutic Exercise Activity 7: Hamstring curls on swiss in supine: x 20 reps    Balance/Neuromuscular Re-Education  Balance/Neuromuscular Re-Education Activity 1: Posterior pelvic tilts: x 20 reps x 3 sh  Balance/Neuromuscular Re-Education Activity 2: Supine marching with pelvic tilt: x 15 reps  Balance/Neuromuscular Re-Education Activity 3: SLR with pelvic tilt: 2 x 10 reps each  Balance/Neuromuscular Re-Education Activity 4: bridges x 15    Therapeutic Activity  Therapeutic Activity 1: step ups 6 in x 20  Therapeutic Activity 2: lateral step up 6 in x 20  Therapeutic Activity 3: mini squat x 20    Assessment & Plan   Assessment: Patient completed familiar interventions well toay with no complaints. She did report pain pre treatment that did not hinder her  activities. She continues to progress each visit. Will continue as tolerated.  Evaluation/Treatment Tolerance: Patient tolerated treatment well    Patient will continue to benefit from skilled outpatient physical therapy to address the deficits listed in the problem list box on initial evaluation, provide pt/family education and to maximize pt's level of independence in the home and community environment.     Patient's spiritual, cultural, and educational needs considered and patient agreeable to plan of care and goals.           Plan: Patient will continue to benefit from skilled therapy services as tolerated.    Goals:   Active       Functional outcome       Patient stated goal: Would like to go and be able to socialize, stand up for longer times.  (Progressing)       Start:  01/27/25    Expected End:  02/28/25            Patient will demonstrate independence in home program for support of progression (Met)       Start:  01/27/25    Expected End:  02/28/25    Resolved:  02/25/25            Pain       Patient will report pain of 3/10 demonstrating a reduction of overall pain (Progressing)       Start:  01/27/25    Expected End:  02/28/25               Strength       Patient will achieve bilateral hip flexion strength of 5/5 (Met)       Start:  01/27/25    Expected End:  02/28/25    Resolved:  02/25/25         Patient will achieve bilateral knee flexion strength of 5/5 (Met)       Start:  01/27/25    Expected End:  02/28/25    Resolved:  02/25/25         Patient will achieve bilateral knee extension strength of 5/5 (Met)       Start:  01/27/25    Expected End:  02/28/25    Resolved:  02/25/25           Resolved       Activity Tolerance       Patient will demonstrate sitting and standing tolerance of 40 minutes in order to improve activity tolerance.  (Met)       Start:  01/27/25    Expected End:  02/28/25    Resolved:  02/25/25            Range of Motion       Patient will achieve bilateral hip flexion   degrees (Met)       Start:  01/27/25    Expected End:  02/28/25    Resolved:  02/25/25         Patient will achieve bilateral knee ROM of  0-130 degrees  (Met)       Start:  01/27/25    Expected End:  02/28/25    Resolved:  02/25/25             Manjit Nielsen, PT, DPT

## 2025-03-04 ENCOUNTER — CLINICAL SUPPORT (OUTPATIENT)
Dept: REHABILITATION | Facility: HOSPITAL | Age: 50
End: 2025-03-04
Payer: COMMERCIAL

## 2025-03-04 DIAGNOSIS — M47.817 LUMBOSACRAL SPONDYLOSIS WITHOUT MYELOPATHY: ICD-10-CM

## 2025-03-04 DIAGNOSIS — R29.898 WEAKNESS OF BOTH LOWER EXTREMITIES: Primary | ICD-10-CM

## 2025-03-04 DIAGNOSIS — Z74.09 IMPAIRED FUNCTIONAL MOBILITY, BALANCE, GAIT, AND ENDURANCE: ICD-10-CM

## 2025-03-04 PROCEDURE — 97112 NEUROMUSCULAR REEDUCATION: CPT

## 2025-03-04 PROCEDURE — 97110 THERAPEUTIC EXERCISES: CPT

## 2025-03-04 PROCEDURE — 97530 THERAPEUTIC ACTIVITIES: CPT

## 2025-03-04 PROCEDURE — 97010 HOT OR COLD PACKS THERAPY: CPT

## 2025-03-04 NOTE — LETTER
March 4, 2025  ENA Tijerina  [address]  [address] [place]  [facility] [place] [address]    To whom it may concern,     The attached plan of care is being sent to you for review and reference.    You may indicate your approval by signing the document electronically, or by faxing/mailing a signed copy of the final page of this document back to the attention of Manjit Nielsen PT, SUSIET:         Plan of Care 1/27/25   Effective from: 1/27/2025  Effective to: 3/4/2025    Plan ID: [id number]            Participants as of Finalize on 3/4/2025    Name Type Comments Contact Info    ENA Tijerina Referring Provider  [phone]    Manjit Nielsen PT, BEL Physical Therapist         Last Plan Note     Author: Manjit Nielsen PT, DPT Status: Signed Last edited: 3/4/2025 10:15 AM         Outpatient Rehab    Physical Therapy Discharge    Patient Name: PIPER Zayas  MRN: [id number]  YOB: 1975  Encounter Date: 3/4/2025    Therapy Diagnosis:   Encounter Diagnoses   Name Primary?    Weakness of both lower extremities Yes    Lumbosacral spondylosis without myelopathy     Impaired functional mobility, balance, gait, and endurance      Physician: Page Ahuja*    Physician Orders: Eval and Treat  Medical Diagnosis: M47.817 (ICD-10-CM) - Lumbosacral spondylosis without myelopathy     Visit # / Visits Authorized:  7 / 10    Date of Evaluation:  1/27/2025   Insurance Authorization Period: 4/27/25  Plan of Care Certification:  1/27/2025 to 3/4/25  PTA Visit: 0     Time In: 1019   Time Out: 1103  Total Time: 44   Total Billable Time: 44    FOTO:  Intake Score: 48%  Survey Score 1: 60%  Survey Score 2:  %         Subjective   Patient states her back is hurting today. She barely got out of bed. She states may be due to weather. She didnt come last week because she was hurting so bad..  Pain reported as 8/10.      Objective             Treatment:  Therapeutic Exercise  Therapeutic Exercise Activity 1: Nustep: 6 min lvl 2.5  Therapeutic Exercise Activity 2: Calf stretch on slant board: 2 minutes  Therapeutic Exercise Activity 3: Hamstring stretch: 3 x 20 sec hold each  Therapeutic Exercise Activity 4: Lower trunk rotations: 10 reps x 10 sh  Therapeutic Exercise Activity 5: Single knee to chest stretch: 3 x 20 sec hold  Therapeutic Exercise Activity 6: Hip abd/add: x 30 reps; GTB  Therapeutic Exercise Activity 8: Seated theraball rollouts into trunk flexion: 20 reps x 5 sec hold    Balance/Neuromuscular Re-Education  Balance/Neuromuscular Re-Education Activity 1: Posterior pelvic tilts: x 20 reps x 3 sh  Balance/Neuromuscular Re-Education Activity 3: SLR with pelvic tilt: 2 x 10 reps each  Balance/Neuromuscular Re-Education Activity 4: bridges x 15    Therapeutic Activity  Therapeutic Activity 1: step ups 6 in x 20  Therapeutic Activity 3: mini squat x 20    Modalities  Moist Heat (min): 8 min lower back    Assessment & Plan   Assessment: Patient still painful in the lower back today, she reported pain has not been getting any better with therapy. Patient request discharge today after given the choice of extending for two more visits or discharging. She states she wants to go back to the  doctor and get injections or another procedure. Will discharge patient today from outpatient physical therapy continuing [facility].  Evaluation/Treatment Tolerance: Patient tolerated treatment well    Patient's spiritual, cultural, and educational needs considered and patient agreeable to plan of care and goals.           Plan: Will discharge from outpatient physical therapy.    Goals:   Active       Pain       Patient will report pain of 3/10 demonstrating a reduction of overall pain (Unable to Meet)       Start:  01/27/25    Expected End:  02/28/25               Strength       Patient will achieve bilateral hip flexion strength of 5/5 (Met)       Start:  01/27/25     Expected End:  02/28/25    Resolved:  02/25/25         Patient will achieve bilateral knee flexion strength of 5/5 (Met)       Start:  01/27/25    Expected End:  02/28/25    Resolved:  02/25/25         Patient will achieve bilateral knee extension strength of 5/5 (Met)       Start:  01/27/25    Expected End:  02/28/25    Resolved:  02/25/25           Resolved       Activity Tolerance       Patient will demonstrate sitting and standing tolerance of 40 minutes in order to improve activity tolerance.  (Met)       Start:  01/27/25    Expected End:  02/28/25    Resolved:  02/25/25            Functional outcome       Patient stated goal: Would like to go and be able to socialize, stand up for longer times.  (Met)       Start:  01/27/25    Expected End:  02/28/25    Resolved:  03/04/25         Patient will demonstrate independence in home program for support of progression (Met)       Start:  01/27/25    Expected End:  02/28/25    Resolved:  02/25/25            Range of Motion       Patient will achieve bilateral hip flexion  degrees (Met)       Start:  01/27/25    Expected End:  02/28/25    Resolved:  02/25/25         Patient will achieve bilateral knee ROM of  0-130 degrees  (Met)       Start:  01/27/25    Expected End:  02/28/25    Resolved:  02/25/25             Manjit Nielsen, PT, DPT           Current Participants as of 3/4/2025    Name Type Comments Contact Info    ENA Tijerina Referring Provider  [phone]    Signature pending    Manjit Nielsen, PT, DPT Physical Therapist                  Sincerely,      Manjit Nielsen, PT, DPT  [facility]                                                            Dear Manjit Nielsen, PT, DPT,    RE: Ms. Sherin Zayas, MRN: [id number]    I certify that I have reviewed the attached plan of care and agree to the details within.        ___________________________  ___________________________  Provider Printed Name   Provider Signed  Name      ___________________________  Date and Time

## 2025-03-04 NOTE — PROGRESS NOTES
Outpatient Rehab    Physical Therapy Discharge    Patient Name: PIPER Zayas  MRN: [id number]  YOB: 1975  Encounter Date: 3/4/2025    Therapy Diagnosis:   Encounter Diagnoses   Name Primary?    Weakness of both lower extremities Yes    Lumbosacral spondylosis without myelopathy     Impaired functional mobility, balance, gait, and endurance      Physician: Page Ahuja*    Physician Orders: Eval and Treat  Medical Diagnosis: M47.817 (ICD-10-CM) - Lumbosacral spondylosis without myelopathy     Visit # / Visits Authorized:  7 / 10    Date of Evaluation:  1/27/2025   Insurance Authorization Period: 4/27/25  Plan of Care Certification:  1/27/2025 to 3/4/25  PTA Visit: 0     Time In: 1019   Time Out: 1103  Total Time: 44   Total Billable Time: 44    FOTO:  Intake Score: 48%  Survey Score 1: 60%  Survey Score 2:  %         Subjective   Patient states her back is hurting today. She barely got out of bed. She states may be due to weather. She didnt come last week because she was hurting so bad..  Pain reported as 8/10.      Objective            Treatment:  Therapeutic Exercise  Therapeutic Exercise Activity 1: Nustep: 6 min lvl 2.5  Therapeutic Exercise Activity 2: Calf stretch on slant board: 2 minutes  Therapeutic Exercise Activity 3: Hamstring stretch: 3 x 20 sec hold each  Therapeutic Exercise Activity 4: Lower trunk rotations: 10 reps x 10 sh  Therapeutic Exercise Activity 5: Single knee to chest stretch: 3 x 20 sec hold  Therapeutic Exercise Activity 6: Hip abd/add: x 30 reps; GTB  Therapeutic Exercise Activity 8: Seated theraball rollouts into trunk flexion: 20 reps x 5 sec hold    Balance/Neuromuscular Re-Education  Balance/Neuromuscular Re-Education Activity 1: Posterior pelvic tilts: x 20 reps x 3 sh  Balance/Neuromuscular Re-Education Activity 3: SLR with pelvic tilt: 2 x 10 reps each  Balance/Neuromuscular Re-Education Activity 4: bridges x 15    Therapeutic Activity  Therapeutic  Activity 1: step ups 6 in x 20  Therapeutic Activity 3: mini squat x 20    Modalities  Moist Heat (min): 8 min lower back    Assessment & Plan   Assessment: Patient still painful in the lower back today, she reported pain has not been getting any better with therapy. Patient request discharge today after given the choice of extending for two more visits or discharging. She states she wants to go back to the  doctor and get injections or another procedure. Will discharge patient today from outpatient physical therapy continuing [facility].  Evaluation/Treatment Tolerance: Patient tolerated treatment well    Patient's spiritual, cultural, and educational needs considered and patient agreeable to plan of care and goals.           Plan: Will discharge from outpatient physical therapy.    Goals:   Active       Pain       Patient will report pain of 3/10 demonstrating a reduction of overall pain (Unable to Meet)       Start:  01/27/25    Expected End:  02/28/25               Strength       Patient will achieve bilateral hip flexion strength of 5/5 (Met)       Start:  01/27/25    Expected End:  02/28/25    Resolved:  02/25/25         Patient will achieve bilateral knee flexion strength of 5/5 (Met)       Start:  01/27/25    Expected End:  02/28/25    Resolved:  02/25/25         Patient will achieve bilateral knee extension strength of 5/5 (Met)       Start:  01/27/25    Expected End:  02/28/25    Resolved:  02/25/25           Resolved       Activity Tolerance       Patient will demonstrate sitting and standing tolerance of 40 minutes in order to improve activity tolerance.  (Met)       Start:  01/27/25    Expected End:  02/28/25    Resolved:  02/25/25            Functional outcome       Patient stated goal: Would like to go and be able to socialize, stand up for longer times.  (Met)       Start:  01/27/25    Expected End:  02/28/25    Resolved:  03/04/25         Patient will demonstrate independence in home program for support of  progression (Met)       Start:  01/27/25    Expected End:  02/28/25    Resolved:  02/25/25            Range of Motion       Patient will achieve bilateral hip flexion  degrees (Met)       Start:  01/27/25    Expected End:  02/28/25    Resolved:  02/25/25         Patient will achieve bilateral knee ROM of  0-130 degrees  (Met)       Start:  01/27/25    Expected End:  02/28/25    Resolved:  02/25/25             Manjit Nielsen PT, DPT

## 2025-05-01 DIAGNOSIS — Z01.419 ENCOUNTER FOR GYNECOLOGICAL EXAMINATION (GENERAL) (ROUTINE) WITHOUT ABNORMAL FINDINGS: Primary | ICD-10-CM
